# Patient Record
Sex: MALE | Race: WHITE | NOT HISPANIC OR LATINO | Employment: OTHER | RURAL
[De-identification: names, ages, dates, MRNs, and addresses within clinical notes are randomized per-mention and may not be internally consistent; named-entity substitution may affect disease eponyms.]

---

## 2017-03-06 RX ORDER — NEBIVOLOL HYDROCHLORIDE 10 MG/1
TABLET ORAL
Qty: 90 TABLET | Refills: 2 | Status: SHIPPED | OUTPATIENT
Start: 2017-03-06 | End: 2017-12-05 | Stop reason: SDUPTHER

## 2017-03-06 RX ORDER — NEBIVOLOL HYDROCHLORIDE 10 MG/1
TABLET ORAL
Qty: 90 TABLET | Refills: 2 | Status: SHIPPED | OUTPATIENT
Start: 2017-03-06 | End: 2017-03-06 | Stop reason: SDUPTHER

## 2017-03-06 RX ORDER — ROSUVASTATIN CALCIUM 40 MG/1
TABLET, FILM COATED ORAL
Qty: 90 TABLET | Refills: 2 | Status: SHIPPED | OUTPATIENT
Start: 2017-03-06 | End: 2017-03-10

## 2017-03-10 RX ORDER — ROSUVASTATIN CALCIUM 40 MG/1
40 TABLET, COATED ORAL DAILY
Qty: 90 TABLET | Refills: 1 | Status: SHIPPED | OUTPATIENT
Start: 2017-03-10 | End: 2017-09-19 | Stop reason: SDUPTHER

## 2017-06-15 ENCOUNTER — OFFICE VISIT (OUTPATIENT)
Dept: CARDIOLOGY | Facility: CLINIC | Age: 72
End: 2017-06-15

## 2017-06-15 VITALS
BODY MASS INDEX: 37.22 KG/M2 | SYSTOLIC BLOOD PRESSURE: 146 MMHG | HEART RATE: 70 BPM | HEIGHT: 74 IN | WEIGHT: 290 LBS | DIASTOLIC BLOOD PRESSURE: 88 MMHG

## 2017-06-15 DIAGNOSIS — I44.30 AVB (ATRIOVENTRICULAR BLOCK): ICD-10-CM

## 2017-06-15 DIAGNOSIS — I48.20 CHRONIC ATRIAL FIBRILLATION (HCC): Primary | ICD-10-CM

## 2017-06-15 PROCEDURE — 93280 PM DEVICE PROGR EVAL DUAL: CPT | Performed by: INTERNAL MEDICINE

## 2017-06-15 PROCEDURE — 99213 OFFICE O/P EST LOW 20 MIN: CPT | Performed by: INTERNAL MEDICINE

## 2017-06-15 RX ORDER — CETIRIZINE HYDROCHLORIDE 10 MG/1
10 TABLET ORAL DAILY
COMMUNITY

## 2017-06-15 NOTE — PROGRESS NOTES
Markus Allen  1945  [phone]      06/15/2017    [facility] CARDIOLOGY     Maxi Walker MD  [address] [place] [id number]    Chief Complaint   Patient presents with   • Atrial Fibrillation         PROBLEM LIST:  1. Atrial fibrillation:  a. Pulmonary vein ablation and ablation of right atrial tachycardia, March 2005.  b. Recurrent episodes of atrial fibrillation with AV node ablation and pacemaker implantation, July 2007.   c. Repeat pulmonary vein ablation with development of persistent atrial flutter.  d. Failure of multiple antiarrhythmic therapies.  e. Insertion of a left atrial appendage occlusive device, WATCHMAN device 24 mm, September 2015.   f. XAVI, 02/01/2016: Normal LV size and function, moderate MR and TR. Well seated WATCHMAN device with limited flow around the device with a maximum measurement of 0.28.   g. Initiation of Plavix therapy, 02/01/2016.   h. XAVI 11/2016: A 24 mm watchman device is visualized within the left atrial appendage. The device appears secure. There is a small amount of flow around one edge of the device measuring approximately 4 mm. The velocities within the device appear to be 10-20 mmHg. No thrombus or fibrinous strands appear to be in association with the device. EF 60%  2. Coronary artery disease:   3. Hypertension.   4. Dyslipidemia.  5. Hematuria.  6. Vallecillo’s esophagitis with subsequent esophageal cancer status post chemotherapy and radiation therapy.   7. Extensive GI bleed, gastric ulcers with intolerance to Coumadin.   8. Obesity.  9. Severe large incisional abdominal hernia.   10. Remote operations:  a. Upper extremity lipoma removal.  b. [place] teeth extraction.  c. Pulmonary vein ablation x2.  Allergies  Allergies   Allergen Reactions   • Codeine    • Novocain [Procaine]        Current Medications    Current Outpatient Prescriptions:   •  amLODIPine-benazepril (LOTREL) 10-20 MG per capsule, daily., Disp: , Rfl:   •   "aspirin 325 MG tablet, Take 325 mg by mouth daily., Disp: , Rfl:   •  BYSTOLIC 10 MG tablet, TAKE 1 TABLET BY MOUTH DAILY, Disp: 90 tablet, Rfl: 2  •  cetirizine (zyrTEC) 10 MG tablet, Take 10 mg by mouth Daily., Disp: , Rfl:   •  denosumab (XGEVA) 120 MG/1.7ML solution injection, Inject  under the skin Every 3 (Three) Months., Disp: , Rfl:   •  esomeprazole (NexIUM) 40 MG capsule, Take 40 mg by mouth daily., Disp: , Rfl:   •  fenofibrate 160 MG tablet, 160 mg daily., Disp: , Rfl:   •  furosemide (LASIX) 40 MG tablet, 40 mg daily., Disp: , Rfl:   •  potassium chloride (K-DUR) 10 MEQ CR tablet, 40 mEq 2 (Two) Times a Day., Disp: , Rfl:   •  rosuvastatin (CRESTOR) 40 MG tablet, Take 1 tablet by mouth Daily., Disp: 90 tablet, Rfl: 1  •  sertraline (ZOLOFT) 50 MG tablet, 50 mg 2 (two) times a day., Disp: , Rfl:     History of Present Illness   HPI    Pt presents for follow up of atrial fibrillation s/p AVN ablation with PM and s/p Watchman device. Since we last saw the pt, pt denies any awareness AF episodes, CP, LH, and dizziness. Denies any hospitalizations, ER visits, bleeding, or TIA/CVA symptoms. Overall feels well. He did have PNA and SOB and is now on the mend. He has been coughing and his PCP adjusted his allergy medication which has helped.     ROS:  General:  Denies fatigue, weight gain or loss  Cardiovascular:  Denies CP, PND, syncope, near syncope, edema or palpitations.  Pulmonary:  + URIBE, cough, or wheezing      Vitals:    06/15/17 1230   BP: 146/88   BP Location: Right arm   Patient Position: Sitting   Pulse: 70   Weight: 290 lb (132 kg)   Height: 74\" (188 cm)     PE:  General: NAD  Neck: no JVD, no carotid bruits, no TM  Heart RRR, NL S1, S2, S4 present, no rubs, murmurs  Lungs: CTA, no wheezes, rhonchi, or rales  Abd: soft, non-tender, NL BS  Ext: No musculoskeletal deformities, no edema, cyanosis, or clubbing  Psych: normal mood and affect    Diagnostic Data:  Procedures     PM check: normal function, " 100% mode switched, 95% RV paced. 9.5-10.5 years on battery    1. Chronic atrial fibrillation    2. AVB (atrioventricular block)          Plan:  1) Atrial fibrillation - chronic in nature s/p AVN ablation.   Continue present medications.   2) Anticoagulation  Continue ASA, s/p Watchman device  3) AVB with normal PM function  Wt loss, exercise, salt reduction    F/up in 6 months    Scribed for Nando Zelaya MD by Sarah Rosa PA-C. 6/15/2017  12:49 PM      I, Nando Zelaya MD, personally performed the services face to face as described in this documentation and as scribed by the above named individual in my presence, and it is both accurate and complete.  6/15/2017  1:08 PM

## 2017-06-16 ENCOUNTER — OFFICE VISIT (OUTPATIENT)
Dept: CARDIOLOGY | Facility: CLINIC | Age: 72
End: 2017-06-16

## 2017-06-16 VITALS
DIASTOLIC BLOOD PRESSURE: 82 MMHG | BODY MASS INDEX: 38.43 KG/M2 | SYSTOLIC BLOOD PRESSURE: 150 MMHG | WEIGHT: 299.4 LBS | HEART RATE: 70 BPM | HEIGHT: 74 IN

## 2017-06-16 DIAGNOSIS — E78.5 DYSLIPIDEMIA: ICD-10-CM

## 2017-06-16 DIAGNOSIS — I10 ESSENTIAL HYPERTENSION: ICD-10-CM

## 2017-06-16 DIAGNOSIS — I25.9 ISCHEMIC HEART DISEASE: ICD-10-CM

## 2017-06-16 DIAGNOSIS — I25.10 CORONARY ARTERY DISEASE INVOLVING NATIVE CORONARY ARTERY OF NATIVE HEART WITHOUT ANGINA PECTORIS: Primary | ICD-10-CM

## 2017-06-16 DIAGNOSIS — I48.20 CHRONIC ATRIAL FIBRILLATION (HCC): ICD-10-CM

## 2017-06-16 PROCEDURE — 99213 OFFICE O/P EST LOW 20 MIN: CPT | Performed by: INTERNAL MEDICINE

## 2017-06-16 RX ORDER — ASPIRIN 325 MG
325 TABLET, DELAYED RELEASE (ENTERIC COATED) ORAL DAILY
COMMUNITY

## 2017-06-16 NOTE — PROGRESS NOTES
Subjective:     Encounter Date:06/16/2017      Patient ID: Markus Allen is a 71 y.o.  white male, [facility] retired /retired [employer] , from [place], [place].     REFERRING PHYSICIAN/INTERNIST: Maxi Walker MD  [place] CARDIOLOGIST: Sami Bernal MD, [facility]  INTERVENTIONAL CARDIOLOGIST: Dhaval Joaquin MD, [facility], [facility]  ELECTROPHYSIOLOGIST: Nando Zelaya MD, [facility], [facility]  UROLOGIST: Bennett Garcia III, MD  HEMATOLOGIST: Dheeraj Knott MD   CURRENT PHYSICIAN:  Unknown    Chief Complaint:   Chief Complaint   Patient presents with   • Follow-up     Problem List:  1. Ischemic heart disease:  a. Remote intermittent recurrent CCS class III-IV angina pectoris with abnormal positive intravenous adenosine SPECT/thallium-201 study/abnormal EKG with 3-vessel coronary atherosclerosis with severe 2-vessel branch involvement/acceptable left ventricular function, LVEF (0.70), December 1997.  b. Acceptable nondiagnostic borderline GXT, LVEF (0.72), July 2003.  c. Paroxysmal atrial fibrillation with abnormal myocardial perfusion study and subsequent catheterization studies demonstrating diffuse 3-vessel coronary atherosclerosis with 90% stenosis at origin of the mid portion of second obtuse marginal branch and nondominant left circumflex coronary artery treated with angioplasty and stent deployment using drug eluting stent with acceptable left ventricular function, LVEF (0.62), January 2005.  d. Abnormal IV [place] Quantitative SPECT gated Cardiolite study, LVEF (0.65), April 2010.  e. Residual CCS class I angina pectoris/NYHA class II to III exertional dyspnea and fatigue syndrome.  f. Recent residual CCS class I symptoms.  2. Severe hypertension/probably essential with remote normal selective bilateral renal arteriography, December 1997.  3. Dyslipidemia.  4. Remote tobacco abuse, resolved.  5. Abnormal chest x-ray.  6. Remote abnormal colonoscopy with findings of  malignant polyp with subsequent sigmoid partial colectomy--data deficit, July 2014 with development of severe large incisional hernia.  7. Intermittent paroxysmal atrial fibrillation with oral anticoagulation and sotalol therapy and subsequently DC cardioversion, February 2005; March 2005, with subsequent sustained atrial fibrillation Tikosyn therapy followed by EP study and successful superior pulmonary vein isolation, as well as ablation of incessant right atrial tachycardia, May 2006 with subsequent apparent dual chamber pacemaker implant, July 2007 with subsequent EP study and AV node ablation with acceptable EP follow up, February 2009 with abnormal pacemaker interrogation with 29% AMS without reprogramming, August 2011, January 2012 with subsequent repeat XAVI and extensive attempt at ablation of atrial fibrillation with development of persistent atrial flutter, June 2012 with contemplated DC cardioversion, September 2012.   a. Watchman device implanted September 2015 by Dr. Zelaya.  b. Apparent recent pulse generator change; data deficit.  c. XAVI November 2016; a 24 mm watchman device visualized within left atrial appendage, device appears secure, small amount of fluid around one edge of the device measuring approximately 4 mm.  The velocities within the device appeared to be 10-20 mmHg.  No thrombus or fibrinous strands appear to be in association with the device.  LVEF 0.60  d. Pacemaker interrogation June 2017; normal function, 100% mode switch, 95% RV paced.  9.5-10.5 years on battery  8. Post procedure hematuria with hospitalization for hypokalemia, anemia, uncontrolled hematuria requiring cystoscopy and apparent cauterization of bladder tear -- data deficit ([facility]), May 2006 with recent progressive apparent lower tract obstructive symptoms and contemplated TURP - data deficit, June 2010.   9. Remote operations:  a. Upper extremity lipoma removal.  b. [place] teeth  extraction.  10. Moderate obesity, BMI 37.5.  11. Apparent severe anemia with transfusion and subsequent eight courses of weekly IV iron therapy - data deficit, autumn 2012.   12. Contemplated DC cardioversion, September 2012 with device interrogation demonstrating persistent atrial fibrillation with VVI rate of 40 with 100% ventricular paced, September 2013 with subsequent redo pulmonary vein isolation procedure, June 2012 with subsequent internal cardioversion of atrial fibrillation to normal sinus rhythm, September 2012 with ERAF.  13. Remote apparent acceptable colonoscopy with abnormal EGD demonstrating Vallecillo’s esophagitis with apparent esophageal carcinoma with subsequent endoscopic EGD and scheduled PET scan with contemplated laparoscopic surgical resection as well as repair of possible adjunctive repair of large incisional ventral hernia with contemplated cardiothoracic surgical consultation at [facility] - data deficit, August-September 2014 with subsequent radiotherapy and chemotherapy with subsequent exploratory laparoscopic surgery with EGD without finding residual cancer - data deficit, [facility], January 2015 with subsequent marked GI bleeding and apparent gastric ulcer - data deficit, [facility], February 2015.       Allergies   Allergen Reactions   • Codeine    • Novocain [Procaine]          Current Outpatient Prescriptions:   •  amLODIPine-benazepril (LOTREL) 10-20 MG per capsule, daily., Disp: , Rfl:   •  aspirin 325 MG tablet, Take 325 mg by mouth daily., Disp: , Rfl:   •  BYSTOLIC 10 MG tablet, TAKE 1 TABLET BY MOUTH DAILY, Disp: 90 tablet, Rfl: 2  •  cetirizine (zyrTEC) 10 MG tablet, Take 10 mg by mouth Daily., Disp: , Rfl:   •  denosumab (XGEVA) 120 MG/1.7ML solution injection, Inject  under the skin Every 3 (Three) Months., Disp: , Rfl:   •  esomeprazole (NexIUM) 40 MG capsule, Take 40 mg by mouth daily., Disp: , Rfl:   •   "fenofibrate 160 MG tablet, 160 mg daily., Disp: , Rfl:   •  furosemide (LASIX) 40 MG tablet, 40 mg daily., Disp: , Rfl:   •  potassium chloride (K-DUR) 10 MEQ CR tablet, 40 mEq 2 (Two) Times a Day., Disp: , Rfl:   •  rosuvastatin (CRESTOR) 40 MG tablet, Take 1 tablet by mouth Daily., Disp: 90 tablet, Rfl: 1  •  sertraline (ZOLOFT) 50 MG tablet, 50 mg 2 (two) times a day., Disp: , Rfl:     History of Present Illness Patient returns for scheduled 6-month followup.  He denies any chest pain, palpitations, presyncope, or syncope.  He has edema in his lower extremities but wears compression stockings and states that it is not increasing.  He was having some shortness of breath and had pneumonia in December/January, and has battled allergy symptoms since.  He has altered his allergy medication several times and is happy now with Zyrtec.  He denies any sputum production, fevers, chills, nausea, or vomiting.  He has been busy at his grandson's basketball games and is planning a hunting trip soon.  He had a PET scan on Tuesday and was negative for cancer.  He states that he has a \"hole in his spine\" and is discontinuing the use of his bone injections.  He has had difficulty recovering from pneumonia but feels like he is finally \"on the mend.\"  His previous internist has retired, and he cannot recall the name of his current physician.      Review of Systems   Constitution: Positive for malaise/fatigue and weight gain.   Cardiovascular: Positive for orthopnea.   Respiratory: Positive for cough, sleep disturbances due to breathing and wheezing.    Skin: Positive for dry skin.   Musculoskeletal: Positive for arthritis and back pain.   Allergic/Immunologic: Positive for environmental allergies.      Obtained and otherwise negative except as outlined in problem list and HPI.    Procedures       Objective:       Vitals:    06/16/17 0944 06/16/17 0946   BP: 155/85 150/82   BP Location: Right arm Right arm   Patient Position: Sitting " "Standing   Pulse: 70 70   Weight: 299 lb 6.4 oz (136 kg)    Height: 74\" (188 cm)    Recheck blood pressure left arm sitting was 140/74  Body mass index is 38.44 kg/(m^2).   Last weight:  293 lbs.    Physical Exam   Constitutional: He is oriented to person, place, and time. He appears well-developed and well-nourished.   Neck: No JVD present. Carotid bruit is not present. No thyromegaly present.   Cardiovascular: Regular rhythm, S1 normal, S2 normal and normal heart sounds.  Exam reveals no gallop, no S3 and no friction rub.    No murmur heard.  Pulses:       Carotid pulses are 1+ on the right side, and 1+ on the left side.       Radial pulses are 1+ on the right side, and 1+ on the left side.        Femoral pulses are 1+ on the right side, and 1+ on the left side.       Popliteal pulses are 1+ on the right side, and 1+ on the left side.        Dorsalis pedis pulses are 1+ on the right side, and 1+ on the left side.        Posterior tibial pulses are 1+ on the right side, and 1+ on the left side.   Pulmonary/Chest: Effort normal and breath sounds normal. He has no wheezes. He has no rhonchi. He has no rales.   Abdominal: Soft. He exhibits no mass. There is no hepatosplenomegaly. There is no tenderness. There is no guarding. A hernia (marked ventral) is present.   Musculoskeletal: He exhibits edema.   Lymphadenopathy:     He has no cervical adenopathy.   Neurological: He is alert and oriented to person, place, and time.   Skin: Skin is warm, dry and intact. No rash noted.   Vitals reviewed.      Lab Review:   Lab Results   Component Value Date    GLUCOSE 97 09/28/2015    BUN 13 09/28/2015    CREATININE 0.9 09/28/2015    CO2 29 09/28/2015    CALCIUM 8.7 09/28/2015       Lab Results   Component Value Date    WBC 8.16 09/28/2015    HGB 13.4 09/28/2015    HCT 41.5 09/28/2015    MCV 84.2 09/28/2015     09/28/2015       Lab Results   Component Value Date    HGBA1C 6.0 09/28/2015           Assessment:   Overall " continued acceptable course with no interim cardiopulmonary complaints with acceptable functional status. We will defer additional diagnostic or therapeutic intervention from a cardiac perspective at this time.  We encouraged the patient to share his labs when drawn again.  He had recent nominal evaluation during followup with Dr. Zelaya.       Diagnosis Plan   1. Coronary artery disease involving native coronary artery of native heart without angina pectoris     2. Essential hypertension     3. Chronic atrial fibrillation     4. Ischemic heart disease     5. Dyslipidemia            Plan:         1. Patient to continue current medications and close follow up with the above providers.  2. Tentative cardiology follow up in 6 months or patient may return sooner PRN; consideration of pharmacologic myocardial perfusion study shortly thereafter.      Scribed for Cornelio Jimenez MD by Luisana Moore, [facility]. 6/16/2017  10:09 AM    I, Cornelio Jimenez MD, [facility], personally performed the services described in this documentation as scribed by the above named individual in my presence, and it is both accurate and complete. At 10:30 AM on 06/16/2017

## 2017-09-19 RX ORDER — ROSUVASTATIN CALCIUM 40 MG/1
TABLET, COATED ORAL
Qty: 90 TABLET | Refills: 1 | Status: SHIPPED | OUTPATIENT
Start: 2017-09-19 | End: 2018-03-21 | Stop reason: SDUPTHER

## 2017-10-05 ENCOUNTER — CLINICAL SUPPORT NO REQUIREMENTS (OUTPATIENT)
Dept: CARDIOLOGY | Facility: CLINIC | Age: 72
End: 2017-10-05

## 2017-10-05 DIAGNOSIS — R00.1 SINUS BRADYCARDIA: ICD-10-CM

## 2017-10-05 PROCEDURE — 93296 REM INTERROG EVL PM/IDS: CPT | Performed by: INTERNAL MEDICINE

## 2017-10-05 PROCEDURE — 93294 REM INTERROG EVL PM/LDLS PM: CPT | Performed by: INTERNAL MEDICINE

## 2017-12-05 RX ORDER — NEBIVOLOL HYDROCHLORIDE 10 MG/1
TABLET ORAL
Qty: 90 TABLET | Refills: 2 | Status: SHIPPED | OUTPATIENT
Start: 2017-12-05 | End: 2018-09-04 | Stop reason: SDUPTHER

## 2018-01-17 ENCOUNTER — LAB REQUISITION (OUTPATIENT)
Dept: LAB | Facility: HOSPITAL | Age: 73
End: 2018-01-17

## 2018-01-17 ENCOUNTER — OFFICE VISIT (OUTPATIENT)
Dept: CARDIOLOGY | Facility: CLINIC | Age: 73
End: 2018-01-17

## 2018-01-17 VITALS
SYSTOLIC BLOOD PRESSURE: 149 MMHG | WEIGHT: 301.6 LBS | DIASTOLIC BLOOD PRESSURE: 69 MMHG | HEIGHT: 74 IN | BODY MASS INDEX: 38.71 KG/M2 | HEART RATE: 70 BPM

## 2018-01-17 DIAGNOSIS — I48.91 ATRIAL FIBRILLATION, UNSPECIFIED TYPE (HCC): Primary | ICD-10-CM

## 2018-01-17 DIAGNOSIS — I10 ESSENTIAL HYPERTENSION: ICD-10-CM

## 2018-01-17 DIAGNOSIS — E78.5 DYSLIPIDEMIA: ICD-10-CM

## 2018-01-17 DIAGNOSIS — I48.91 ATRIAL FIBRILLATION (HCC): ICD-10-CM

## 2018-01-17 DIAGNOSIS — I48.20 CHRONIC ATRIAL FIBRILLATION (HCC): ICD-10-CM

## 2018-01-17 DIAGNOSIS — Z00.00 ROUTINE GENERAL MEDICAL EXAMINATION AT A HEALTH CARE FACILITY: ICD-10-CM

## 2018-01-17 DIAGNOSIS — I25.10 CORONARY ARTERY DISEASE INVOLVING NATIVE CORONARY ARTERY OF NATIVE HEART WITHOUT ANGINA PECTORIS: ICD-10-CM

## 2018-01-17 PROCEDURE — 36415 COLL VENOUS BLD VENIPUNCTURE: CPT

## 2018-01-17 PROCEDURE — 99214 OFFICE O/P EST MOD 30 MIN: CPT | Performed by: INTERNAL MEDICINE

## 2018-01-17 RX ORDER — CHLORTHALIDONE 25 MG/1
12.5 TABLET ORAL DAILY
Qty: 45 TABLET | Refills: 3 | Status: SHIPPED | OUTPATIENT
Start: 2018-01-17 | End: 2018-02-27 | Stop reason: SDUPTHER

## 2018-01-17 RX ORDER — FUROSEMIDE 40 MG/1
40 TABLET ORAL DAILY
Qty: 45 TABLET | Refills: 11 | Status: SHIPPED | OUTPATIENT
Start: 2018-01-17 | End: 2019-01-11 | Stop reason: ALTCHOICE

## 2018-01-17 NOTE — PROGRESS NOTES
Subjective:     Encounter Date:01/17/2018    Patient ID: Markus Allen is a 72 y.o.  white male, [facility] retired /retired [place] , from [place], [place].      REFERRING PHYSICIAN/INTERNIST: Maxi Walker MD  [place] CARDIOLOGIST: Sami Bernal MD, Located within [facility]  INTERVENTIONAL CARDIOLOGIST: Dhaval Joaquin MD, Located within [facility], [facility]  ELECTROPHYSIOLOGIST: Nando Zelaya MD, Located within [facility], [facility]  UROLOGIST: Bennett Garcia III, MD  HEMATOLOGIST: Dheeraj Knott MD   CURRENT PHYSICIAN:  Edita Hall MD    Chief Complaint:   Chief Complaint   Patient presents with   • Shortness of Breath   • Coronary Artery Disease   • Atrial Fibrillation     Problem List:  1. Ischemic heart disease:   A. Remote intermittent recurrent CCS class III-IV angina pectoris with abnormal positive intravenous adenosine SPECT/thallium-201 study/abnormal EKG with 3-vessel coronary atherosclerosis with severe 2-vessel branch involvement/acceptable left ventricular function, LVEF (0.70), December 1997.   B. Acceptable nondiagnostic borderline GXT, LVEF (0.72), July 2003.   C. Paroxysmal atrial fibrillation with abnormal myocardial perfusion study and subsequent catheterization studies demonstrating diffuse 3-vessel coronary atherosclerosis with 90% stenosis at origin of the mid portion of second obtuse marginal branch and nondominant left circumflex coronary artery treated with angioplasty and stent deployment using drug eluting stent with acceptable left ventricular function, LVEF (0.62), January 2005.   D. Abnormal IV Adenowalk Quantitative SPECT gated Cardiolite study, LVEF (0.65), April 2010.   E. Residual CCS class I angina pectoris/NYHA class II to III exertional dyspnea and fatigue syndrome with recent [facility] ED evaluation for probable congestive heart failure, January 2018.   F. Residual class I symptoms.  2. Severe hypertension/probably essential with remote normal selective bilateral renal  arteriography, December 1997.  3. Dyslipidemia.  4. Remote tobacco abuse, resolved.  5. Abnormal chest x-ray.  6. Remote abnormal colonoscopy with findings of malignant polyp with subsequent sigmoid partial colectomy--data deficit, July 2014 with development of severe large incisional hernia.  7. Intermittent paroxysmal atrial fibrillation with oral anticoagulation and sotalol therapy and subsequently DC cardioversion, February 2005; March 2005, with subsequent sustained atrial fibrillation Tikosyn therapy followed by EP study and successful superior pulmonary vein isolation, as well as ablation of incessant right atrial tachycardia, May 2006 with subsequent apparent dual chamber pacemaker implant, July 2007 with subsequent EP study and AV node ablation with acceptable EP follow up, February 2009 with abnormal pacemaker interrogation with 29% AMS without reprogramming, August 2011, January 2012 with subsequent repeat XAVI and extensive attempt at ablation of atrial fibrillation with development of persistent atrial flutter, June 2012 with contemplated DC cardioversion, September 2012.   8. Watchman device implanted September 2015 by Dr. Zelaya.   A. Apparent recent pulse generator change; data deficit.   B. XAVI November 2016; a 24 mm watchman device visualized within left atrial appendage, device appears secure, small amount of fluid around one edge of the device measuring approximately 4 mm.  The velocities within the device appeared to be 10-20 mmHg.  No thrombus or fibrinous strands appear to be in association with the device.  LVEF 0.60   C. Pacemaker interrogation June 2017; normal function, 100% mode switch, 95% RV paced.  9.5-10.5 years on battery  9. Post procedure hematuria with hospitalization for hypokalemia, anemia, uncontrolled hematuria requiring cystoscopy and apparent cauterization of bladder tear -- data deficit ([facility]), May 2006 with recent progressive apparent lower tract  obstructive symptoms and contemplated TURP - data deficit, June 2010.   10. Remote operations:   A. Upper extremity lipoma removal.   B. Remer teeth extraction.  11. Moderate obesity, BMI 37.5.  12. Apparent severe anemia with transfusion and subsequent eight courses of weekly IV iron therapy - data deficit, autumn 2012.   13. Contemplated DC cardioversion, September 2012 with device interrogation demonstrating persistent atrial fibrillation with VVI rate of 40 with 100% ventricular paced, September 2013 with subsequent redo pulmonary vein isolation procedure, June 2012 with subsequent internal cardioversion of atrial fibrillation to normal sinus rhythm, September 2012 with ERAF.  14. Remote apparent acceptable colonoscopy with abnormal EGD demonstrating Vallecillo’s esophagitis with apparent esophageal carcinoma with subsequent endoscopic EGD and scheduled PET scan with contemplated laparoscopic surgical resection as well as repair of possible adjunctive repair of large incisional ventral hernia with contemplated cardiothoracic surgical consultation at [facility] - data deficit, August-September 2014 with subsequent radiotherapy and chemotherapy with subsequent exploratory laparoscopic surgery with EGD without finding residual cancer - data deficit, [facility], January 2015 with subsequent marked GI bleeding and apparent gastric ulcer - data deficit, [facility], February 2015.   15.  [facility] ED visit 1/6/18 for CHF symptoms with chest x-ray demonstrating cardiomegaly with pulmonary edema      Allergies   Allergen Reactions   • Codeine    • Novocain [Procaine]          Current Outpatient Prescriptions:   •  amLODIPine-benazepril (LOTREL) 10-20 MG per capsule, daily., Disp: , Rfl:   •  aspirin 81 MG EC tablet, Take 81 mg by mouth Daily., Disp: , Rfl:   •  BYSTOLIC 10 MG tablet, TAKE 1 TABLET BY MOUTH DAILY, Disp: 90 tablet,  Rfl: 2  •  Calcium Carbonate-Vitamin D3 (CALCIUM 600+D3) 600-400 MG-UNIT tablet, Take  by mouth Daily., Disp: , Rfl:   •  cetirizine (zyrTEC) 10 MG tablet, Take 10 mg by mouth Daily., Disp: , Rfl:   •  esomeprazole (NexIUM) 40 MG capsule, Take 40 mg by mouth daily., Disp: , Rfl:   •  fenofibrate 160 MG tablet, 160 mg daily., Disp: , Rfl:   •  furosemide (LASIX) 40 MG tablet, 40 mg daily., Disp: , Rfl:   •  potassium chloride (K-DUR) 10 MEQ CR tablet, 40 mEq 2 (Two) Times a Day., Disp: , Rfl:   •  rosuvastatin (CRESTOR) 40 MG tablet, TAKE 1 TABLET BY MOUTH EVERY DAY, Disp: 90 tablet, Rfl: 1  •  sertraline (ZOLOFT) 50 MG tablet, 50 mg 2 (two) times a day., Disp: , Rfl:     HISTORY OF PRESENT ILLNESS: Patient returns for scheduled followup after a 7-month hiatus.  He had a [facility] ED visit 1/6/18 when he was having increased shortness of breath.  At that time, he was treated for pneumonia but was also thought to have some CHF.  He had some mild edema in his legs but mostly was having the shortness of breath.  He denied any fevers, chills, chest pain, palpitations, presyncope, or syncope.  He has mild sputum production.  His diuretic was increased, and he states that this has helped him feel much better.  He is taking his Lasix twice a day now.  His blood pressure at home is normally between 150-160 systolic.  Patient otherwise denies chest pain, severe shortness of breath, PND, edema, palpitations, syncope or presyncope at this time on limited activity.  He is accompanied to the office today by his wife.        Review of Systems   Cardiovascular: Positive for dyspnea on exertion.   Respiratory: Positive for cough, shortness of breath, snoring and wheezing.    Skin: Positive for dry skin.      Obtained and otherwise negative except as outlined in problem list and HPI.    Procedures       Objective:       Vitals:    01/17/18 0910 01/17/18 0914   BP: 167/74 149/69   BP Location: Left arm Left  "arm   Patient Position: Sitting Standing   Pulse: 70 70   Weight: (!) 137 kg (301 lb 9.6 oz)    Height: 188 cm (74\")    Recheck blood pressure right arm sitting was 142/64  Body mass index is 38.72 kg/(m^2).   Last weight:  299 lbs.    Physical Exam   Constitutional: He is oriented to person, place, and time. He appears well-developed and well-nourished.   Neck: No JVD present. Carotid bruit is not present. No thyromegaly present.   Cardiovascular: Regular rhythm, S1 normal, S2 normal and normal heart sounds.  Exam reveals no gallop, no S3 and no friction rub.    No murmur heard.  Pulses:       Dorsalis pedis pulses are 1+ on the right side, and 1+ on the left side.        Posterior tibial pulses are 1+ on the right side, and 1+ on the left side.   Pulmonary/Chest: Effort normal. He has wheezes in the right upper field, the right middle field, the right lower field, the left upper field, the left middle field and the left lower field. He has no rhonchi. He has no rales.   Abdominal: Soft. He exhibits no mass. There is no hepatosplenomegaly. There is no tenderness. There is no guarding.   Bowel sounds audible x4   Musculoskeletal: Normal range of motion. He exhibits edema (1+ bipedal).   Lymphadenopathy:     He has no cervical adenopathy.   Neurological: He is alert and oriented to person, place, and time.   Skin: Skin is warm, dry and intact. No rash noted.   Vitals reviewed.        Lab Review:   Lab Results   Component Value Date    GLUCOSE 97 09/28/2015    BUN 13 09/28/2015    CREATININE 0.9 09/28/2015    CO2 29 09/28/2015    CALCIUM 8.7 09/28/2015       Lab Results   Component Value Date    WBC 8.16 09/28/2015    HGB 13.4 09/28/2015    HCT 41.5 09/28/2015    MCV 84.2 09/28/2015     09/28/2015       Lab Results   Component Value Date    HGBA1C 6.0 09/28/2015     · Chest x-ray 1/6/18: Slight increase in cardiomegaly with mild perihilar opacity that may represent pulmonary edema  · CMP 1/6/18: Sodium 141, " potassium 3.4, chloride 100.4, carbon dioxide 27, BUN 11, creatinine 0.9, GFR greater than 59, glucose 133, calcium 8.8, bilirubin 0.5, AST 24, AST 19, troponin less than 0.01, protein 7.5, albumin 3.9, globulin 3.6, alkaline phosphatase 70  · Pro BNP - 891.6, was 1359 in February 2017  · CBC 1/6/18: WBC 8.4, RBC 4.99, hemoglobin 14.4, hematocrit 44.9, MCV 90, MCH 28.9, MCHC 32.1, RDW 14.2, platelets 245, MPV 10.1, neutrophils 74.7, lymphocytes 11.1, monocytes 10.8, eos 2.5, basos 0.4  · ECG 1/6/18: Ventricular pacemaker, abnormal rhythm ECG, 69 bpm,  ms,  ms      Assessment:   Overall continued acceptable course with no interim cardiopulmonary complaints with acceptable functional status. We will defer additional diagnostic or therapeutic intervention from a cardiac perspective at this time.  We will add chlorthalidone 12.5 mg daily for his hypertension.  We advised the patient to continue taking his Lasix, but if he feels more short of breath or has more edema, that he may take an extra 20 mg of Lasix when necessary.  We will order an echocardiogram in June 2018 when he returns.  We will also get a BMP and magnesium today.       Diagnosis Plan   1. Atrial fibrillation, unspecified type  Adult Transthoracic Echo Complete W/ Cont if Necessary Per Protocol    Basic Metabolic Panel    Magnesium   2. Chronic atrial fibrillation  Stable   3. Coronary artery disease involving native coronary artery of native heart without angina pectoris  Stable   4. Essential hypertension  Chlorthalidone 12.5mg daily   5. Dyslipidemia  No recent labs          Plan:         1. Patient to continue current medications and close follow up with the above providers.  2. Tentative cardiology follow up in June 2018 with an echocardiogram, or patient may return sooner PRN.  3. BMP, magnesium  4. Chlorthalidone 12.5 mg daily  5. 1-800 card provided.       Scribed for Cornelio Jimenez MD by Luisana Moore, [facility]. 1/17/2018  10:38  AM    I, Cornelio Jimenez MD, FAC, personally performed the services described in this documentation as scribed by the above named individual in my presence, and it is both accurate and complete. At 10:47 AM on 01/17/2018

## 2018-01-18 ENCOUNTER — TELEPHONE (OUTPATIENT)
Dept: CARDIOLOGY | Facility: CLINIC | Age: 73
End: 2018-01-18

## 2018-01-18 LAB
BUN SERPL-MCNC: 14 MG/DL (ref 9–23)
BUN/CREAT SERPL: 15.6 (ref 7–25)
CALCIUM SERPL-MCNC: 9.2 MG/DL (ref 8.7–10.4)
CHLORIDE SERPL-SCNC: 106 MMOL/L (ref 99–109)
CO2 SERPL-SCNC: 30 MMOL/L (ref 20–31)
CREAT SERPL-MCNC: 0.9 MG/DL (ref 0.6–1.3)
GLUCOSE SERPL-MCNC: 158 MG/DL (ref 70–100)
MAGNESIUM SERPL-MCNC: 1.9 MG/DL (ref 1.3–2.7)
POTASSIUM SERPL-SCNC: 3.8 MMOL/L (ref 3.5–5.5)
SODIUM SERPL-SCNC: 143 MMOL/L (ref 132–146)

## 2018-01-18 NOTE — TELEPHONE ENCOUNTER
Spoke with patient about lab work results.  Per KTS patient is to increase K-Dur to 40 mg daily and add Magnesium 400 mg daily. Patient verbalizes understanding.

## 2018-01-22 ENCOUNTER — RESULTS ENCOUNTER (OUTPATIENT)
Dept: CARDIOLOGY | Facility: CLINIC | Age: 73
End: 2018-01-22

## 2018-01-22 DIAGNOSIS — I48.91 ATRIAL FIBRILLATION, UNSPECIFIED TYPE (HCC): ICD-10-CM

## 2018-01-30 ENCOUNTER — TELEPHONE (OUTPATIENT)
Dept: CARDIOLOGY | Facility: CLINIC | Age: 73
End: 2018-01-30

## 2018-01-30 NOTE — TELEPHONE ENCOUNTER
Patient called stating that he started his new medication Chlorthalidone 12.5 mg that was give for his blood pressure.  He states that his top number still has not decreased and is staying the in 160's with the bottom in 70's.  This medication was added at his last appointment on 1/17/2018.  Do you want to increase this  Medication or change anything?  Please advise.

## 2018-02-01 DIAGNOSIS — I10 ESSENTIAL HYPERTENSION: Primary | ICD-10-CM

## 2018-02-06 ENCOUNTER — RESULTS ENCOUNTER (OUTPATIENT)
Dept: CARDIOLOGY | Facility: CLINIC | Age: 73
End: 2018-02-06

## 2018-02-06 DIAGNOSIS — I10 ESSENTIAL HYPERTENSION: ICD-10-CM

## 2018-02-15 ENCOUNTER — OFFICE VISIT (OUTPATIENT)
Dept: CARDIOLOGY | Facility: CLINIC | Age: 73
End: 2018-02-15

## 2018-02-15 VITALS
HEART RATE: 70 BPM | SYSTOLIC BLOOD PRESSURE: 121 MMHG | BODY MASS INDEX: 37.47 KG/M2 | WEIGHT: 292 LBS | HEIGHT: 74 IN | DIASTOLIC BLOOD PRESSURE: 70 MMHG

## 2018-02-15 DIAGNOSIS — I10 ESSENTIAL HYPERTENSION: ICD-10-CM

## 2018-02-15 DIAGNOSIS — I48.19 PERSISTENT ATRIAL FIBRILLATION (HCC): Primary | ICD-10-CM

## 2018-02-15 DIAGNOSIS — I25.10 CORONARY ARTERY DISEASE INVOLVING NATIVE CORONARY ARTERY OF NATIVE HEART WITHOUT ANGINA PECTORIS: ICD-10-CM

## 2018-02-15 DIAGNOSIS — I44.30 AVB (ATRIOVENTRICULAR BLOCK): ICD-10-CM

## 2018-02-15 PROCEDURE — 99213 OFFICE O/P EST LOW 20 MIN: CPT | Performed by: INTERNAL MEDICINE

## 2018-02-15 PROCEDURE — 93279 PRGRMG DEV EVAL PM/LDLS PM: CPT | Performed by: INTERNAL MEDICINE

## 2018-02-15 NOTE — PROGRESS NOTES
Markus Allen  1945  [phone]      02/15/2018    [facility] CARDIOLOGY     Edita Hall MD  [address]  [facility] [address]    Chief Complaint   Patient presents with   • Atrial Fibrillation       Problem List:  PROBLEM LIST:  1. Atrial fibrillation:  a. Pulmonary vein ablation and ablation of right atrial tachycardia, March 2005.  b. Recurrent episodes of atrial fibrillation with AV node ablation and pacemaker implantation, July 2007.   c. Repeat pulmonary vein ablation with development of persistent atrial flutter.  d. Failure of multiple antiarrhythmic therapies.  e. Insertion of a left atrial appendage occlusive device, WATCHMAN device 24 mm, September 2015.   f. XAVI, 02/01/2016: Normal LV size and function, moderate MR and TR. Well seated WATCHMAN device with limited flow around the device with a maximum measurement of 0.28.   g. Initiation of Plavix therapy, 02/01/2016.   h. XAVI 11/2016: A 24 mm watchman device is visualized within the left atrial appendage. The device appears secure. There is a small amount of flow around one edge of the device measuring approximately 4 mm. The velocities within the device appear to be 10-20 mmHg. No thrombus or fibrinous strands appear to be in association with the device. EF 60%  2. Coronary artery disease:   3. Hypertension.   4. Dyslipidemia.  5. Hematuria.  6. Vallecillo’s esophagitis with subsequent esophageal cancer status post chemotherapy and radiation therapy.   7. Extensive GI bleed, gastric ulcers with intolerance to Coumadin.   8. Obesity.  9. Severe large incisional abdominal hernia.   10. Remote operations:  a. Upper extremity lipoma removal.  b. [place] teeth extraction.  c. Pulmonary vein ablation x2.    Allergies  Allergies   Allergen Reactions   • Codeine    • Novocain [Procaine]        Current Medications    Current Outpatient Prescriptions:   •  amLODIPine-benazepril (LOTREL) 10-20 MG per capsule, 2 (Two)  "Times a Day., Disp: , Rfl:   •  aspirin 81 MG EC tablet, Take 325 mg by mouth Daily., Disp: , Rfl:   •  BYSTOLIC 10 MG tablet, TAKE 1 TABLET BY MOUTH DAILY, Disp: 90 tablet, Rfl: 2  •  Calcium Carbonate-Vitamin D3 (CALCIUM 600+D3) 600-400 MG-UNIT tablet, Take  by mouth 2 (Two) Times a Day., Disp: , Rfl:   •  cetirizine (zyrTEC) 10 MG tablet, Take 10 mg by mouth Daily., Disp: , Rfl:   •  chlorthalidone (HYGROTON) 25 MG tablet, Take 0.5 tablets by mouth Daily. (Patient taking differently: Take 25 mg by mouth Daily.), Disp: 45 tablet, Rfl: 3  •  esomeprazole (NexIUM) 40 MG capsule, Take 40 mg by mouth daily., Disp: , Rfl:   •  fenofibrate 160 MG tablet, 160 mg daily., Disp: , Rfl:   •  furosemide (LASIX) 40 MG tablet, Take 1 tablet by mouth Daily. May take an extra 20 mg PRN daily for increased edema or SOB, Disp: 45 tablet, Rfl: 11  •  potassium chloride (K-DUR) 10 MEQ CR tablet, 40 mEq 2 (Two) Times a Day., Disp: , Rfl:   •  Prenatal MV-Min-Fe Fum-FA-DHA (PRENATAL 1 PO), Take  by mouth Daily., Disp: , Rfl:   •  rosuvastatin (CRESTOR) 40 MG tablet, TAKE 1 TABLET BY MOUTH EVERY DAY, Disp: 90 tablet, Rfl: 1  •  sertraline (ZOLOFT) 50 MG tablet, 50 mg 2 (two) times a day., Disp: , Rfl:     History of Present Illness   HPI    Pt presents for follow up of AF/AVB/ROSHAN occlusion .Since the pt has seen us in clinic last, pt denies any syncope, CP, LH, and dizziness. Denies any bleeding, or TIA/CVA symptoms. Overall feels well. Has been having short term memory loss. Had been treated for CHF/PNA in December. SOB better on lasix. BP at home was high but now better after Dr Jimenez started new med.    ROS:  General: + fatigue, No weight gain or loss  Cardiovascular:  Denies CP, PND, syncope, near syncope, edema or palpitations.  Pulmonary:  + URIBE, No cough, or wheezing    Vitals:    02/15/18 1222   BP: 121/70   BP Location: Left arm   Patient Position: Sitting   Pulse: 70   Weight: 132 kg (292 lb)   Height: 188 cm (74\") "       PE:  General: NAD  Neck: no JVD, no carotid bruits, no TM  Heart RRR, NL S1, S2, S4 present, no rubs, DOROTA murmur present  Lungs: CTA, no wheezes, rhonchi, or rales  Abd: soft, non-tender, NL BS, large ventral hernia  Ext: No musculoskeletal deformities, no edema, cyanosis, or clubbing  Psych: normal mood and affect    Diagnostic Data:  Procedures      1. Persistent atrial fibrillation    2. AVB (atrioventricular block)    3. Essential hypertension    4. Coronary artery disease involving native coronary artery of native heart without angina pectoris        PM Interrogation: NL PM fxn, Nl battery fxn, CAF, 98% paced    Plan:  1) CAF RFA of AVN: NL Pm fxn asymptomatic, NL Pm fxn  2)  Anticoagulation s/p ROSHAN occlusion on ASA  3) HTN: better controlled  Wt loss, exercise, salt reduction  4) CAD: per Dr Jimenez    F/up in 6 months

## 2018-02-27 ENCOUNTER — TELEPHONE (OUTPATIENT)
Dept: CARDIOLOGY | Facility: CLINIC | Age: 73
End: 2018-02-27

## 2018-02-27 RX ORDER — POTASSIUM CHLORIDE 1500 MG/1
40 TABLET, FILM COATED, EXTENDED RELEASE ORAL
Qty: 270 TABLET | Refills: 4 | Status: SHIPPED | OUTPATIENT
Start: 2018-02-27

## 2018-02-27 RX ORDER — CHLORTHALIDONE 25 MG/1
25 TABLET ORAL DAILY
Qty: 90 TABLET | Refills: 4 | Status: SHIPPED | OUTPATIENT
Start: 2018-02-27 | End: 2019-01-11 | Stop reason: SDUPTHER

## 2018-02-27 NOTE — TELEPHONE ENCOUNTER
Per KTS change KCL to 40 meq TID and add mag 400 mg daily.  Patient verbalizes understanding. Also patient requested a new prescription for chlorthalidone. Sent Chlorthalidone and KCL to [facility] pharmacy. Patient aware.

## 2018-03-21 RX ORDER — ROSUVASTATIN CALCIUM 40 MG/1
TABLET, COATED ORAL
Qty: 90 TABLET | Refills: 3 | Status: SHIPPED | OUTPATIENT
Start: 2018-03-21

## 2018-04-10 ENCOUNTER — TELEPHONE (OUTPATIENT)
Dept: CARDIOLOGY | Facility: CLINIC | Age: 73
End: 2018-04-10

## 2018-04-10 NOTE — TELEPHONE ENCOUNTER
Patient called and stated that he might be undergoing a procedure for a bladder biopsy and wanted to know if it was okay to stop aspirin 5 days prior?

## 2018-05-22 ENCOUNTER — CLINICAL SUPPORT NO REQUIREMENTS (OUTPATIENT)
Dept: CARDIOLOGY | Facility: CLINIC | Age: 73
End: 2018-05-22

## 2018-05-23 ENCOUNTER — TELEPHONE (OUTPATIENT)
Dept: CARDIOLOGY | Facility: CLINIC | Age: 73
End: 2018-05-23

## 2018-05-24 ENCOUNTER — TELEPHONE (OUTPATIENT)
Dept: CARDIOLOGY | Facility: CLINIC | Age: 73
End: 2018-05-24

## 2018-05-24 ENCOUNTER — CLINICAL SUPPORT NO REQUIREMENTS (OUTPATIENT)
Dept: CARDIOLOGY | Facility: CLINIC | Age: 73
End: 2018-05-24

## 2018-05-24 DIAGNOSIS — I48.19 PERSISTENT ATRIAL FIBRILLATION (HCC): ICD-10-CM

## 2018-05-24 PROCEDURE — 93296 REM INTERROG EVL PM/IDS: CPT | Performed by: INTERNAL MEDICINE

## 2018-05-24 PROCEDURE — 93294 REM INTERROG EVL PM/LDLS PM: CPT | Performed by: INTERNAL MEDICINE

## 2018-05-24 NOTE — TELEPHONE ENCOUNTER
Left message yesterday for Mr Dahl to return my call regarding home monitoring. His wife called me this morning to inquire why I was calling. I explained to her that I needed him to send in a manual transmission and she stated she was at work and I could call him at home. I called him at home and again had to leave a message.

## 2018-06-20 ENCOUNTER — HOSPITAL ENCOUNTER (OUTPATIENT)
Dept: CARDIOLOGY | Facility: HOSPITAL | Age: 73
Discharge: HOME | End: 2018-06-20
Attending: INTERNAL MEDICINE | Admitting: INTERNAL MEDICINE

## 2018-06-20 ENCOUNTER — OFFICE VISIT (OUTPATIENT)
Dept: CARDIOLOGY | Facility: CLINIC | Age: 73
End: 2018-06-20

## 2018-06-20 VITALS
DIASTOLIC BLOOD PRESSURE: 66 MMHG | WEIGHT: 308 LBS | HEART RATE: 70 BPM | SYSTOLIC BLOOD PRESSURE: 126 MMHG | BODY MASS INDEX: 39.53 KG/M2 | HEIGHT: 74 IN

## 2018-06-20 DIAGNOSIS — E66.9 OBESITY (BMI 35.0-39.9 WITHOUT COMORBIDITY): ICD-10-CM

## 2018-06-20 DIAGNOSIS — I25.9 ISCHEMIC HEART DISEASE: Primary | ICD-10-CM

## 2018-06-20 DIAGNOSIS — E78.5 DYSLIPIDEMIA: ICD-10-CM

## 2018-06-20 DIAGNOSIS — I48.91 ATRIAL FIBRILLATION, UNSPECIFIED TYPE (HCC): ICD-10-CM

## 2018-06-20 DIAGNOSIS — I48.20 CHRONIC ATRIAL FIBRILLATION (HCC): ICD-10-CM

## 2018-06-20 LAB
BH CV ECHO MEAS - AO ROOT AREA (BSA CORRECTED): 1
BH CV ECHO MEAS - AO ROOT AREA: 5.5 CM^2
BH CV ECHO MEAS - AO ROOT DIAM: 2.7 CM
BH CV ECHO MEAS - BSA(HAYCOCK): 2.7 M^2
BH CV ECHO MEAS - BSA: 2.6 M^2
BH CV ECHO MEAS - BZI_BMI: 37.5 KILOGRAMS/M^2
BH CV ECHO MEAS - BZI_METRIC_HEIGHT: 188 CM
BH CV ECHO MEAS - BZI_METRIC_WEIGHT: 132.5 KG
BH CV ECHO MEAS - EDV(CUBED): 146.7 ML
BH CV ECHO MEAS - EDV(TEICH): 133.8 ML
BH CV ECHO MEAS - EF(CUBED): 64.4 %
BH CV ECHO MEAS - EF(TEICH): 55.5 %
BH CV ECHO MEAS - ESV(CUBED): 52.3 ML
BH CV ECHO MEAS - ESV(TEICH): 59.6 ML
BH CV ECHO MEAS - FS: 29.1 %
BH CV ECHO MEAS - IVS/LVPW: 0.99
BH CV ECHO MEAS - IVSD: 1.3 CM
BH CV ECHO MEAS - LA DIMENSION: 6 CM
BH CV ECHO MEAS - LA/AO: 2.3
BH CV ECHO MEAS - LV MASS(C)D: 294.6 GRAMS
BH CV ECHO MEAS - LV MASS(C)DI: 115.4 GRAMS/M^2
BH CV ECHO MEAS - LVIDD: 5.3 CM
BH CV ECHO MEAS - LVIDS: 3.7 CM
BH CV ECHO MEAS - LVPWD: 1.3 CM
BH CV ECHO MEAS - MV A MAX VEL: 34.3 CM/SEC
BH CV ECHO MEAS - MV DEC SLOPE: 532 CM/SEC^2
BH CV ECHO MEAS - MV DEC TIME: 0.29 SEC
BH CV ECHO MEAS - MV E MAX VEL: 106 CM/SEC
BH CV ECHO MEAS - MV E/A: 3.1
BH CV ECHO MEAS - MV P1/2T MAX VEL: 148.8 CM/SEC
BH CV ECHO MEAS - MV P1/2T: 81.9 MSEC
BH CV ECHO MEAS - MVA P1/2T LCG: 1.5 CM^2
BH CV ECHO MEAS - MVA(P1/2T): 2.7 CM^2
BH CV ECHO MEAS - PA ACC TIME: 0.14 SEC
BH CV ECHO MEAS - PA PR(ACCEL): 14.1 MMHG
BH CV ECHO MEAS - PI END-D VEL: 186.8 CM/SEC
BH CV ECHO MEAS - RAP SYSTOLE: 3 MMHG
BH CV ECHO MEAS - RV MAX PG: 2.6 MMHG
BH CV ECHO MEAS - RV V1 MAX: 81.4 CM/SEC
BH CV ECHO MEAS - RVSP: 37 MMHG
BH CV ECHO MEAS - SI(CUBED): 37 ML/M^2
BH CV ECHO MEAS - SI(TEICH): 29.1 ML/M^2
BH CV ECHO MEAS - SV(CUBED): 94.4 ML
BH CV ECHO MEAS - SV(TEICH): 74.2 ML
BH CV ECHO MEAS - TAPSE (>1.6): 1.83 CM2
BH CV ECHO MEAS - TR MAX VEL: 290.9 CM/SEC
BH CV XLRA - RV BASE: 3.82 CM
BH CV XLRA - RV LENGTH: 7.46 CM
BH CV XLRA - RV MID: 3.8 CM
BH CV XLRA - TDI S': 11.2 CM/SEC
LV EF 2D ECHO EST: 58 %
MAXIMAL PREDICTED HEART RATE: 150 BPM
STRESS TARGET HR: 128 BPM

## 2018-06-20 PROCEDURE — 93306 TTE W/DOPPLER COMPLETE: CPT | Performed by: INTERNAL MEDICINE

## 2018-06-20 PROCEDURE — 93306 TTE W/DOPPLER COMPLETE: CPT

## 2018-06-20 PROCEDURE — 99214 OFFICE O/P EST MOD 30 MIN: CPT | Performed by: INTERNAL MEDICINE

## 2018-06-20 RX ORDER — TAMSULOSIN HYDROCHLORIDE 0.4 MG/1
1 CAPSULE ORAL NIGHTLY
COMMUNITY

## 2018-06-20 NOTE — PROGRESS NOTES
Subjective:     Encounter Date:06/20/2018    Patient ID: Markus Allen is a 72 y.o.  white male, [facility] [employer] retired /retired [employer] , from [place], [place].      REFERRING PHYSICIAN/INTERNIST: Maxi Walker MD  [place] CARDIOLOGIST: Sami Bernal MD, [facility]  INTERVENTIONAL CARDIOLOGIST: Dhaval Joaquin MD, [facility], [facility] [place] [facility]  ELECTROPHYSIOLOGIST: Nando Zelaya MD, [facility], [facility]  UROLOGIST: Bennett Garcia III, MD  HEMATOLOGIST: Dheerja Knott MD   CURRENT PHYSICIAN:  Edita Hall MD    Chief Complaint:   Chief Complaint   Patient presents with   • Atrial Fibrillation   • Coronary Artery Disease   • Shortness of Breath     on exertion   • Dizziness   • Edema     Problem List:  1. Ischemic heart disease:              A. Remote intermittent recurrent CCS class III-IV angina pectoris with abnormal positive intravenous adenosine SPECT/thallium-201 study/abnormal EKG with 3-vessel coronary atherosclerosis with severe 2-vessel branch involvement/acceptable left ventricular function, LVEF (0.70), December 1997.              B. Acceptable nondiagnostic borderline GXT, LVEF (0.72), July 2003.              C. Paroxysmal atrial fibrillation with abnormal myocardial perfusion study and subsequent catheterization studies demonstrating diffuse 3-vessel coronary atherosclerosis with 90% stenosis at origin of the mid portion of second obtuse marginal branch and nondominant left circumflex coronary artery treated with angioplasty and stent deployment using drug eluting stent with acceptable left ventricular function, LVEF (0.62), January 2005.              D. Abnormal IV [place] Quantitative SPECT gated Cardiolite study, LVEF (0.65), April 2010.              E. Residual CCS class I angina pectoris/NYHA class II to III exertional dyspnea and fatigue syndrome with recent [facility] ED evaluation for probable congestive heart failure, January 2018.              F. Residual class I  symptoms.  2. Severe hypertension/probably essential with remote normal selective bilateral renal arteriography, December 1997.  3. Dyslipidemia.  4. Remote tobacco abuse, resolved.  5. Abnormal chest x-ray.  6. Remote abnormal colonoscopy with findings of malignant polyp with subsequent sigmoid partial colectomy--data deficit, July 2014 with development of severe large incisional hernia.  7. Intermittent paroxysmal atrial fibrillation with oral anticoagulation and sotalol therapy and subsequently DC cardioversion, February 2005; March 2005, with subsequent sustained atrial fibrillation Tikosyn therapy followed by EP study and successful superior pulmonary vein isolation, as well as ablation of incessant right atrial tachycardia, May 2006 with subsequent apparent dual chamber pacemaker implant, July 2007 with subsequent EP study and AV node ablation with acceptable EP follow up, February 2009 with abnormal pacemaker interrogation with 29% AMS without reprogramming, August 2011, January 2012 with subsequent repeat XAVI and extensive attempt at ablation of atrial fibrillation with development of persistent atrial flutter, June 2012 with contemplated DC cardioversion, September 2012.   8. Watchman device implanted September 2015 by Dr. Zelaya.              A. Apparent recent pulse generator change; data deficit.              B. XAVI, November 2016; a 24 mm Watchman device visualized within left atrial appendage, device appears secure, small amount of fluid around one edge of the device measuring approximately 4 mm.  The velocities within the device appeared to be 10-20 mmHg.  No thrombus or fibrinous strands appear to be in association with the device.  LVEF 0.60              C. Pacemaker interrogation, June 2017; normal function, 100% mode switch, 95% RV paced; 9.5-10.5 years on battery  9. Post procedure hematuria with hospitalization for hypokalemia, anemia, uncontrolled hematuria requiring cystoscopy and apparent  cauterization of bladder tear -- data deficit ([facility]), May 2006 with recent progressive apparent lower tract obstructive symptoms and contemplated TURP - data deficit, June 2010.   10. Remote operations:              A. Upper extremity lipoma removal.              B. [place] teeth extraction.  11. Moderate obesity, BMI 39.5.  12. Apparent severe anemia with transfusion and subsequent eight courses of weekly IV iron therapy - data deficit, autumn 2012.   13. Contemplated DC cardioversion, September 2012, with device interrogation demonstrating persistent atrial fibrillation with VVI rate of 40 with 100% ventricular paced, September 2013 with subsequent redo pulmonary vein isolation procedure, June 2012 with subsequent internal cardioversion of atrial fibrillation to normal sinus rhythm, September 2012 with ERAF.  14. Remote apparent acceptable colonoscopy with abnormal EGD demonstrating Vallecillo’s esophagitis with apparent esophageal carcinoma with subsequent endoscopic EGD and scheduled PET scan with contemplated laparoscopic surgical resection as well as repair of possible adjunctive repair of large incisional ventral hernia with contemplated cardiothoracic surgical consultation at [facility] - data deficit, August-September 2014 with subsequent radiotherapy and chemotherapy with subsequent exploratory laparoscopic surgery with EGD without finding of residual cancer - data deficit, [facility], January 2015 with subsequent marked GI bleeding and apparent gastric ulcer - data deficit, [facility], February 2015.   15.  [facility] ED visit 1/6/18 for CHF symptoms with chest x-ray demonstrating cardiomegaly with pulmonary edema      Allergies   Allergen Reactions   • Codeine    • Novocain [Procaine]          Current Outpatient Prescriptions:   •  amLODIPine-benazepril (LOTREL) 10-20 MG per capsule, 2 (Two)  Times a Day., Disp: , Rfl:   •  aspirin 81 MG EC tablet, Take 325 mg by mouth Daily., Disp: , Rfl:   •  BYSTOLIC 10 MG tablet, TAKE 1 TABLET BY MOUTH DAILY, Disp: 90 tablet, Rfl: 2  •  Calcium Carbonate-Vitamin D3 (CALCIUM 600+D3) 600-400 MG-UNIT tablet, Take  by mouth 2 (Two) Times a Day., Disp: , Rfl:   •  cetirizine (zyrTEC) 10 MG tablet, Take 10 mg by mouth Daily., Disp: , Rfl:   •  chlorthalidone (HYGROTON) 25 MG tablet, Take 1 tablet by mouth Daily., Disp: 90 tablet, Rfl: 4  •  esomeprazole (NexIUM) 40 MG capsule, Take 40 mg by mouth daily., Disp: , Rfl:   •  fenofibrate 160 MG tablet, 160 mg daily., Disp: , Rfl:   •  furosemide (LASIX) 40 MG tablet, Take 1 tablet by mouth Daily. May take an extra 20 mg PRN daily for increased edema or SOB, Disp: 45 tablet, Rfl: 11  •  magnesium oxide (MAGOX) 400 (241.3 Mg) MG tablet tablet, Take 400 mg by mouth Daily., Disp: , Rfl:   •  potassium chloride (K-TAB) 20 MEQ tablet controlled-release ER tablet, Take 2 tablets by mouth 3 (Three) Times a Day With Meals., Disp: 270 tablet, Rfl: 4  •  Prenatal MV-Min-Fe Fum-FA-DHA (PRENATAL 1 PO), Take  by mouth Daily., Disp: , Rfl:   •  rosuvastatin (CRESTOR) 40 MG tablet, TAKE 1 TABLET BY MOUTH EVERY DAY, Disp: 90 tablet, Rfl: 3  •  sertraline (ZOLOFT) 50 MG tablet, 50 mg 2 (two) times a day., Disp: , Rfl:   •  tamsulosin (FLOMAX) 0.4 MG capsule 24 hr capsule, Take 1 capsule by mouth Every Night., Disp: , Rfl:     HISTORY OF PRESENT ILLNESS: Patient presents for scheduled 5-month followup.  He had an echocardiogram before presenting to our office today, and this will be read, with a letter forwarded to the patient with those results.  He is accompanied to the office today by his wife.  He says that about 2 weeks ago, he was feeling really good, but then he started having hematuria, and he had a cystoscopy and a CT scan and was found to have a small renal tumor; data deficit.  He is scheduled to have another CT scan in autumn 2018.   "He plans to get back to walking now that he is starting to feel better.  He is scheduled to see Dr. Hall tomorrow and will have labs drawn at that time; he will ask that we be provided those results.  He states that when he was having a bladder scan, he got up quickly and had dizziness; he is cautioned to be careful anytime he is changing position.  He has no symptoms from a cardiopulmonary perspective with his daily activities.  Patient otherwise denies chest pain, shortness of breath, PND, edema, palpitations, syncope or presyncope at this time on limited activity.        Review of Systems   Cardiovascular: Positive for leg swelling.   Respiratory: Positive for shortness of breath and sleep disturbances due to breathing.    Musculoskeletal: Positive for back pain, joint pain and joint swelling.      Obtained and otherwise negative except as outlined in problem list and HPI.    Procedures       Objective:       Vitals:    06/20/18 1501 06/20/18 1502   BP: 134/80 126/66   BP Location: Left arm Left arm   Patient Position: Sitting Standing   Pulse: 70 70   Weight:  (!) 140 kg (308 lb)   Height:  188 cm (74\")     Body mass index is 39.54 kg/m².   Last weight:  301 lbs.    Physical Exam   Constitutional: He is oriented to person, place, and time. He appears well-developed and well-nourished.   Neck: No JVD present. Carotid bruit is not present. No thyromegaly present.   Cardiovascular: Regular rhythm, S1 normal and S2 normal.  Exam reveals distant heart sounds. Exam reveals no gallop, no S3 and no friction rub.    No murmur heard.  Pulses:       Carotid pulses are 1+ on the right side, and 1+ on the left side.       Radial pulses are 1+ on the right side, and 1+ on the left side.        Femoral pulses are 1+ on the right side, and 1+ on the left side.       Popliteal pulses are 1+ on the right side, and 1+ on the left side.        Dorsalis pedis pulses are 1+ on the right side, and 1+ on the left side.        " Posterior tibial pulses are 1+ on the right side, and 1+ on the left side.   Pulmonary/Chest: Effort normal. He has decreased breath sounds. He has no wheezes. He has no rhonchi. He has no rales.   Left precordial pacemaker site is nominal   Abdominal: Soft. He exhibits no mass. There is no hepatosplenomegaly. There is no tenderness. There is no guarding.   Bowel sounds audible x4   Musculoskeletal: Normal range of motion. He exhibits no edema.   Lymphadenopathy:     He has no cervical adenopathy.   Neurological: He is alert and oriented to person, place, and time.   Skin: Skin is warm, dry and intact. No rash noted.   Vitals reviewed.        Lab Review:   Lab Results   Component Value Date    GLUCOSE 97 09/28/2015    BUN 14 01/17/2018    CREATININE 0.90 01/17/2018    EGFRIFNONA 83 01/17/2018    EGFRIFAFRI 101 01/17/2018    BCR 15.6 01/17/2018    CO2 30.0 01/17/2018    CALCIUM 9.2 01/17/2018       Lab Results   Component Value Date    WBC 8.16 09/28/2015    HGB 13.4 09/28/2015    HCT 41.5 09/28/2015    MCV 84.2 09/28/2015     09/28/2015       Lab Results   Component Value Date    HGBA1C 6.0 09/28/2015           Assessment:   Overall continued acceptable course with no interim cardiopulmonary complaints with acceptable functional status. We will defer additional diagnostic or therapeutic intervention from a cardiac perspective at this time.  Hopefully, we will be allowed to obtain the results of any lab tests he has done tomorrow in Dr. Hall's office.  Would defer [facility]/C at this time.       Diagnosis Plan   1. Ischemic heart disease  No recurrent angina pectoris or CHF; continue current treatment on limited activity   2. Obesity (BMI 35.0-39.9 without comorbidity)  Continued efforts at caloric restriction and activity encouraged   3. Chronic atrial fibrillation  Continue current treatment and follow up with Dr. Zelaya as scheduled in 2 months   4. Dyslipidemia  No data to review          Plan:          1. Patient to continue current medications and close follow up with the above providers.  2. Tentative cardiology follow up in December 2018, or patient may return sooner PRN.    Transcribed by Lashae Salas for Dr. Cornelio Jimenez at 3:13 PM on 06/20/2018    ICornelio MD, [facility], personally performed the services described in this documentation as scribed by the above named individual in my presence, and it is both accurate and complete. At 4:34 PM on 06/20/2018

## 2018-08-16 ENCOUNTER — OFFICE VISIT (OUTPATIENT)
Dept: CARDIOLOGY | Facility: CLINIC | Age: 73
End: 2018-08-16

## 2018-08-16 VITALS
HEART RATE: 71 BPM | DIASTOLIC BLOOD PRESSURE: 82 MMHG | HEIGHT: 74 IN | WEIGHT: 290 LBS | SYSTOLIC BLOOD PRESSURE: 125 MMHG | BODY MASS INDEX: 37.22 KG/M2 | OXYGEN SATURATION: 97 %

## 2018-08-16 DIAGNOSIS — I48.20 CHRONIC ATRIAL FIBRILLATION (HCC): Primary | ICD-10-CM

## 2018-08-16 DIAGNOSIS — I44.30 AVB (ATRIOVENTRICULAR BLOCK): ICD-10-CM

## 2018-08-16 DIAGNOSIS — I25.10 CORONARY ARTERY DISEASE INVOLVING NATIVE CORONARY ARTERY OF NATIVE HEART WITHOUT ANGINA PECTORIS: ICD-10-CM

## 2018-08-16 DIAGNOSIS — I10 ESSENTIAL HYPERTENSION: ICD-10-CM

## 2018-08-16 PROCEDURE — 93280 PM DEVICE PROGR EVAL DUAL: CPT | Performed by: PHYSICIAN ASSISTANT

## 2018-08-16 PROCEDURE — 99213 OFFICE O/P EST LOW 20 MIN: CPT | Performed by: PHYSICIAN ASSISTANT

## 2018-08-16 NOTE — PROGRESS NOTES
Markus Allen  1945    There is no work phone number on file.    08/16/2018    [facility] CARDIOLOGY     Edita Hall MD  [address]  [facility] [id number]    Chief Complaint   Patient presents with   • Atrial Fibrillation         PROBLEM LIST:  1. Atrial fibrillation:  a. Pulmonary vein ablation and ablation of right atrial tachycardia, March 2005.  b. Recurrent episodes of atrial fibrillation with AV node ablation and pacemaker implantation, July 2007.   c. Repeat pulmonary vein ablation with development of persistent atrial flutter.  d. Failure of multiple antiarrhythmic therapies.  e. Insertion of a left atrial appendage occlusive device, WATCHMAN device 24 mm, September 2015.   f. XAVI, 02/01/2016: Normal LV size and function, moderate MR and TR. Well seated WATCHMAN device with limited flow around the device with a maximum measurement of 0.28.   g. Initiation of Plavix therapy, 02/01/2016.   h. XAVI 11/2016: A 24 mm watchman device is visualized within the left atrial appendage. The device appears secure. There is a small amount of flow around one edge of the device measuring approximately 4 mm. The velocities within the device appear to be 10-20 mmHg. No thrombus or fibrinous strands appear to be in association with the device. EF 60%  2. Coronary artery disease:   3. Hypertension.   4. Dyslipidemia.  5. Hematuria.  6. Vallecillo’s esophagitis with subsequent esophageal cancer status post chemotherapy and radiation therapy.   7. Extensive GI bleed, gastric ulcers with intolerance to Coumadin.   8. Obesity.  9. Severe large incisional abdominal hernia.   10. Remote operations:  a. Upper extremity lipoma removal.  b. [place] teeth extraction.  c. Pulmonary vein ablation x2.    Allergies  Allergies   Allergen Reactions   • Codeine    • Novocain [Procaine]        Current Medications    Current Outpatient Prescriptions:   •  amLODIPine-benazepril (LOTREL) 10-20 MG per  capsule, 2 (Two) Times a Day., Disp: , Rfl:   •  aspirin 81 MG EC tablet, Take 325 mg by mouth Daily., Disp: , Rfl:   •  BYSTOLIC 10 MG tablet, TAKE 1 TABLET BY MOUTH DAILY, Disp: 90 tablet, Rfl: 2  •  Calcium Carbonate-Vitamin D3 (CALCIUM 600+D3) 600-400 MG-UNIT tablet, Take  by mouth 2 (Two) Times a Day., Disp: , Rfl:   •  cetirizine (zyrTEC) 10 MG tablet, Take 10 mg by mouth Daily., Disp: , Rfl:   •  chlorthalidone (HYGROTON) 25 MG tablet, Take 1 tablet by mouth Daily., Disp: 90 tablet, Rfl: 4  •  esomeprazole (NexIUM) 40 MG capsule, Take 40 mg by mouth daily., Disp: , Rfl:   •  fenofibrate 160 MG tablet, 160 mg daily., Disp: , Rfl:   •  furosemide (LASIX) 40 MG tablet, Take 1 tablet by mouth Daily. May take an extra 20 mg PRN daily for increased edema or SOB, Disp: 45 tablet, Rfl: 11  •  magnesium oxide (MAGOX) 400 (241.3 Mg) MG tablet tablet, Take 400 mg by mouth 2 (Two) Times a Day., Disp: , Rfl:   •  potassium chloride (K-TAB) 20 MEQ tablet controlled-release ER tablet, Take 2 tablets by mouth 3 (Three) Times a Day With Meals., Disp: 270 tablet, Rfl: 4  •  Prenatal MV-Min-Fe Fum-FA-DHA (PRENATAL 1 PO), Take  by mouth Daily., Disp: , Rfl:   •  rosuvastatin (CRESTOR) 40 MG tablet, TAKE 1 TABLET BY MOUTH EVERY DAY, Disp: 90 tablet, Rfl: 3  •  sertraline (ZOLOFT) 50 MG tablet, 50 mg 2 (two) times a day., Disp: , Rfl:   •  tamsulosin (FLOMAX) 0.4 MG capsule 24 hr capsule, Take 1 capsule by mouth Every Night., Disp: , Rfl:     History of Present Illness   HPI    Pt presents for follow up of Atrial fibrillation, AVB, and ROSHAN occlusion. Since we last saw the pt, pt denies any awareness of  AF, SOB, CP, LH, and dizziness/ syncope. Denies any hospitalizations, ER visits, bleeding, or TIA/CVA symptoms. He saw Dr. Jimenez in June and no changes were made to his medications. BP is well controlled.     ROS:  General:  + fatigue,-  weight gain or loss  Cardiovascular:  Denies CP, PND, syncope, near syncope, +edema-  "palpitations.  Pulmonary:  + URIBE, cough, or wheezing      Vitals:    08/16/18 1214   BP: 125/82   BP Location: Right arm   Patient Position: Sitting   Pulse: 71   SpO2: 97%   Weight: 132 kg (290 lb)   Height: 188 cm (74\")     Body mass index is 37.23 kg/m².  PE:  General: NAD. A & O x 3  Neck: no JVD, no carotid bruits, no TM  Heart RRR, NL S1, S2, S4 present, no rubs, murmurs  Lungs: CTA, no wheezes, rhonchi, or rales  Abd: soft, non-tender, NL BS  Ext: No musculoskeletal deformities, 1+ edema, - cyanosis, or clubbing  Psych: normal mood and affect    Diagnostic Data:    PM check: normal function, 100% afib, 100% V paced. 12 years on battery. Changed to VVIR today.     Procedures    1. Chronic atrial fibrillation (CMS/HCC)    2. AVB (atrioventricular block)    3. Essential hypertension    4. Coronary artery disease involving native coronary artery of native heart without angina pectoris          Plan:  1) CAF RFA of AVN: NL Pm fxn asymptomatic, NL Pm fxn. Changed PM setting to VVIR.   2)  Anticoagulation s/p ROSHAN occlusion on ASA  3) HTN:  controlled  Wt loss, exercise, salt reduction  4) CAD: per Dr Jimenez, recntly seen in June by Dr. Jimenez with no changes made    F/up in 6 months    [facility]  8/16/2018   12:26 PM    "

## 2018-09-04 RX ORDER — NEBIVOLOL HYDROCHLORIDE 10 MG/1
TABLET ORAL
Qty: 90 TABLET | Refills: 2 | Status: SHIPPED | OUTPATIENT
Start: 2018-09-04 | End: 2019-06-03 | Stop reason: SDUPTHER

## 2018-11-14 ENCOUNTER — CLINICAL SUPPORT NO REQUIREMENTS (OUTPATIENT)
Dept: CARDIOLOGY | Facility: CLINIC | Age: 73
End: 2018-11-14

## 2018-11-14 DIAGNOSIS — I48.91 ATRIAL FIBRILLATION, UNSPECIFIED TYPE (HCC): ICD-10-CM

## 2018-11-14 PROCEDURE — 93296 REM INTERROG EVL PM/IDS: CPT | Performed by: INTERNAL MEDICINE

## 2018-11-14 PROCEDURE — 93294 REM INTERROG EVL PM/LDLS PM: CPT | Performed by: INTERNAL MEDICINE

## 2019-01-11 ENCOUNTER — OFFICE VISIT (OUTPATIENT)
Dept: CARDIOLOGY | Facility: CLINIC | Age: 74
End: 2019-01-11

## 2019-01-11 VITALS
SYSTOLIC BLOOD PRESSURE: 145 MMHG | HEART RATE: 72 BPM | BODY MASS INDEX: 40.43 KG/M2 | WEIGHT: 315 LBS | HEIGHT: 74 IN | DIASTOLIC BLOOD PRESSURE: 75 MMHG

## 2019-01-11 DIAGNOSIS — I48.20 CHRONIC ATRIAL FIBRILLATION (HCC): ICD-10-CM

## 2019-01-11 DIAGNOSIS — E78.5 DYSLIPIDEMIA: ICD-10-CM

## 2019-01-11 DIAGNOSIS — I10 ESSENTIAL HYPERTENSION: ICD-10-CM

## 2019-01-11 DIAGNOSIS — I25.10 CORONARY ARTERY DISEASE INVOLVING NATIVE CORONARY ARTERY OF NATIVE HEART WITHOUT ANGINA PECTORIS: Primary | ICD-10-CM

## 2019-01-11 PROCEDURE — 99214 OFFICE O/P EST MOD 30 MIN: CPT | Performed by: INTERNAL MEDICINE

## 2019-01-11 RX ORDER — CHLORTHALIDONE 25 MG/1
12.5 TABLET ORAL DAILY
Qty: 45 TABLET | Refills: 3 | Status: SHIPPED | OUTPATIENT
Start: 2019-01-11 | End: 2020-02-05 | Stop reason: DRUGHIGH

## 2019-01-11 RX ORDER — EPLERENONE 25 MG/1
25 TABLET, FILM COATED ORAL DAILY
Qty: 90 TABLET | Refills: 2 | Status: SHIPPED | OUTPATIENT
Start: 2019-01-11 | End: 2019-10-14 | Stop reason: SDUPTHER

## 2019-01-11 RX ORDER — TORSEMIDE 20 MG/1
20 TABLET ORAL DAILY
Qty: 90 TABLET | Refills: 3 | Status: SHIPPED | OUTPATIENT
Start: 2019-01-11 | End: 2020-01-08

## 2019-01-11 RX ORDER — NITROGLYCERIN 0.4 MG/1
TABLET SUBLINGUAL
Qty: 100 TABLET | Refills: 1 | Status: SHIPPED | OUTPATIENT
Start: 2019-01-11

## 2019-01-11 NOTE — PROGRESS NOTES
Subjective:     Encounter Date:01/11/2019    Patient ID: Markus Allen is a 73 y.o.   white male, [facility] retired /retired [place] , from [place], [place].      REFERRING PHYSICIAN/INTERNIST: Maxi Walker MD  [place] CARDIOLOGIST: Sami Bernal MD, [facility]  INTERVENTIONAL CARDIOLOGIST: Dhavla Joaquin MD, [facility], [facility]  ELECTROPHYSIOLOGIST: Nando Zelaya MD, [facility], [facility]  UROLOGIST: Bennett Garcia III, MD  HEMATOLOGIST: Dheeraj Knott MD   CURRENT PHYSICIAN:  Edtia Hall MD    Chief Complaint:   Chief Complaint   Patient presents with   • Atrial Fibrillation     Problem List:  1. Ischemic heart disease:  a. Remote intermittent recurrent CCS class III-IV angina pectoris with abnormal positive intravenous adenosine SPECT/thallium-201 study/abnormal EKG with 3-vessel coronary atherosclerosis with severe 2-vessel branch involvement/acceptable left ventricular function, LVEF (0.70), December 1997.  b. Acceptable nondiagnostic borderline GXT, LVEF (0.72), July 2003.  c. Paroxysmal atrial fibrillation with abnormal myocardial perfusion study and subsequent catheterization studies demonstrating diffuse 3-vessel coronary atherosclerosis with 90% stenosis at origin of the mid portion of second obtuse marginal branch and nondominant left circumflex coronary artery treated with angioplasty and stent deployment using drug eluting stent with acceptable left ventricular function, LVEF (0.62), January 2005.  d. Abnormal IV Adenowalk Quantitative SPECT gated Cardiolite study, LVEF (0.65), April 2010.  e. Residual CCS class I angina pectoris/NYHA class II to III exertional dyspnea and fatigue syndrome with recent [facility] ED evaluation for probable congestive heart failure, January 2018.  f. Echocardiogram 6/20/18: LA severely dilated, mild to moderate MR, mild-to-moderate TR, the following LV wall segments are hypokinetic: Apical septal, mid inferoseptal, apex hypokinetic  and mid anteroseptal.  LVEF 0.58, RV mildly dilated, no pericardial effusion, mild pulmonary hypertension, mild MAC present   g. CCS  class I chest discomfort/NYHA class I-II URIBE symptoms.  2. Severe hypertension/probably essential with remote normal selective bilateral renal arteriography, December 1997.  3. Dyslipidemia.  4. Remote tobacco abuse, resolved.  5. Abnormal chest x-ray.  6. Remote abnormal colonoscopy with findings of malignant polyp with subsequent sigmoid partial colectomy--data deficit, July 2014 with development of severe large incisional hernia.  7. Intermittent paroxysmal atrial fibrillation with oral anticoagulation and sotalol therapy and subsequently DC cardioversion, February 2005; March 2005, with subsequent sustained atrial fibrillation Tikosyn therapy followed by EP study and successful superior pulmonary vein isolation, as well as ablation of incessant right atrial tachycardia, May 2006 with subsequent apparent dual chamber pacemaker implant, July 2007 with subsequent EP study and AV node ablation with acceptable EP follow up, February 2009 with abnormal pacemaker interrogation with 29% AMS without reprogramming, August 2011, January 2012 with subsequent repeat XAVI and extensive attempt at ablation of atrial fibrillation with development of persistent atrial flutter, June 2012 with contemplated DC cardioversion, September 2012.   8. Watchman device implanted September 2015 by Dr. Zelaya.  a. Apparent recent pulse generator change; data deficit.  b. XAVI, November 2016; a 24 mm Watchman device visualized within left atrial appendage, device appears secure, small amount of fluid around one edge of the device measuring approximately 4 mm. The velocities within the device appeared to be 10-20 mmHg.  No thrombus or fibrinous strands appear to be in association with the device.  LVEF 0.60  c. Pacemaker interrogation, June 2017; normal function, 100% mode switch, 95% RV paced; 9.5-10.5 years  on battery, normal pacemaker check in  November 2018 with no nodes switches  9. Post procedure hematuria with hospitalization for hypokalemia, anemia, uncontrolled hematuria requiring cystoscopy and apparent cauterization of bladder tear -- data deficit ([facility]), May 2006 with recent progressive apparent lower tract obstructive symptoms and contemplated TURP - data deficit, June 2010.   10. Remote operations:  a. Upper extremity lipoma removal.  b. [place] teeth extraction  11. Morbid obesity, BMI 41  12. Apparent severe anemia with transfusion and subsequent eight courses of weekly IV iron therapy - data deficit, autumn 2012.   13. Contemplated DC cardioversion, September 2012, with device interrogation demonstrating persistent atrial fibrillation with VVI rate of 40 with 100% ventricular paced, September 2013 with subsequent redo pulmonary vein isolation procedure, June 2012 with subsequent internal cardioversion of atrial fibrillation to normal sinus rhythm, September 2012 with ERAF.  14. Remote apparent acceptable colonoscopy with abnormal EGD demonstrating Vallecillo’s esophagitis with apparent esophageal carcinoma with subsequent endoscopic EGD and scheduled PET scan with contemplated laparoscopic surgical resection as well as repair of possible adjunctive repair of large incisional ventral hernia with contemplated cardiothoracic surgical consultation at [facility] - data deficit, August-September 2014 with subsequent radiotherapy and chemotherapy with subsequent exploratory laparoscopic surgery with EGD without finding of residual cancer - data deficit, [facility], January 2015 with subsequent marked GI bleeding and apparent gastric ulcer - data deficit, [facility], February 2015.   15. [facility] ED visit 1/6/18 for CHF symptoms with chest x-ray demonstrating cardiomegaly with pulmonary  edema      Allergies   Allergen Reactions   • Codeine    • Novocain [Procaine]          Current Outpatient Medications:   •  amLODIPine-benazepril (LOTREL) 10-20 MG per capsule, 2 (Two) Times a Day., Disp: , Rfl:   •  aspirin 81 MG EC tablet, Take 325 mg by mouth Daily., Disp: , Rfl:   •  BYSTOLIC 10 MG tablet, TAKE 1 TABLET BY MOUTH DAILY, Disp: 90 tablet, Rfl: 2  •  Calcium Carbonate-Vitamin D3 (CALCIUM 600+D3) 600-400 MG-UNIT tablet, Take  by mouth 2 (Two) Times a Day., Disp: , Rfl:   •  cetirizine (zyrTEC) 10 MG tablet, Take 10 mg by mouth Daily., Disp: , Rfl:   •  chlorthalidone (HYGROTON) 25 MG tablet, Take 1 tablet by mouth Daily., Disp: 90 tablet, Rfl: 4  •  esomeprazole (NexIUM) 40 MG capsule, Take 40 mg by mouth daily., Disp: , Rfl:   •  fenofibrate 160 MG tablet, 160 mg daily., Disp: , Rfl:   •  furosemide (LASIX) 40 MG tablet, Take 1 tablet by mouth Daily. May take an extra 20 mg PRN daily for increased edema or SOB (Patient taking differently: Take 20 mg by mouth Daily. May take an extra 20 mg PRN daily for increased edema or SOB), Disp: 45 tablet, Rfl: 11  •  magnesium oxide (MAGOX) 400 (241.3 Mg) MG tablet tablet, Take 400 mg by mouth 2 (Two) Times a Day., Disp: , Rfl:   •  potassium chloride (K-TAB) 20 MEQ tablet controlled-release ER tablet, Take 2 tablets by mouth 3 (Three) Times a Day With Meals., Disp: 270 tablet, Rfl: 4  •  Prenatal MV-Min-Fe Fum-FA-DHA (PRENATAL 1 PO), Take  by mouth Daily., Disp: , Rfl:   •  rosuvastatin (CRESTOR) 40 MG tablet, TAKE 1 TABLET BY MOUTH EVERY DAY, Disp: 90 tablet, Rfl: 3  •  sertraline (ZOLOFT) 50 MG tablet, 50 mg 2 (two) times a day., Disp: , Rfl:   •  tamsulosin (FLOMAX) 0.4 MG capsule 24 hr capsule, Take 1 capsule by mouth Every Night., Disp: , Rfl:     HISTORY OF PRESENT ILLNESS:  Patient is here for 6 month follow-up.  His echocardiogram in June 2018 was acceptable.  His device check was acceptable in November 2018 with no reprogramming.  He denies any chest  "pressure, palpitations, presyncope, or syncope.  He occasionally has shortness of breath and recently had laboratory testing and was found to have kypokalemia.  His Lasix was decreased to 20 mg daily, and he has noticed more bilateral lower extremity edema since it has been decreased.  He has had several urinary tract infections and is having a CT scan of his kidneys soon. He is rather sedentary due to back pain and does little walking.  His blood pressure at home is normally in the 140s systolic.  Patient otherwise denies chest pain, marked shortness of breath, PND, severe edema, palpitations, syncope or presyncope at this time.  He is accompanied to the office today by his wife.  He did have a flu shot this season.      Review of Systems   Constitution: Positive for weakness, malaise/fatigue and weight gain.   Cardiovascular: Positive for leg swelling.   Respiratory: Positive for shortness of breath.    Skin: Positive for dry skin.   Musculoskeletal: Positive for arthritis and back pain.   Gastrointestinal: Positive for constipation.      Obtained and otherwise negative except as outlined in problem list and HPI.    Procedures       Objective:       Vitals:    01/11/19 1506 01/11/19 1508   BP: 149/79 145/75   BP Location: Right arm Right arm   Patient Position: Sitting Standing   Pulse: 70 72   Weight: (!) 145 kg (319 lb)    Height: 188 cm (74\")    Recheck blood pressure right arm sitting was 138/70  Body mass index is 40.96 kg/m².  Last weight June 2018 was 308 pounds    Physical Exam   Constitutional: He is oriented to person, place, and time. He appears well-developed and well-nourished.   Neck: No JVD present. Carotid bruit is not present. No thyromegaly present.   Cardiovascular: Regular rhythm, S1 normal and S2 normal. Exam reveals distant heart sounds. Exam reveals no gallop, no S3 and no friction rub.   Murmur heard.   Medium-pitched early systolic murmur is present with a grade of 2/6 at the lower left " sternal border.  Pulses:       Dorsalis pedis pulses are 1+ on the right side, and 1+ on the left side.        Posterior tibial pulses are 1+ on the right side, and 1+ on the left side.   Pulmonary/Chest: Effort normal. He has decreased breath sounds. He has no wheezes. He has no rhonchi. He has no rales.   Abdominal: Soft. He exhibits no mass. There is no hepatosplenomegaly. There is no tenderness. There is no guarding.   Bowel sounds audible x4   Musculoskeletal: Normal range of motion. He exhibits edema (1+).   Lymphadenopathy:     He has no cervical adenopathy.   Neurological: He is alert and oriented to person, place, and time.   Skin: Skin is warm, dry and intact. No rash noted.   Vitals reviewed.        Lab Review:   Lab Results   Component Value Date    GLUCOSE 97 09/28/2015    BUN 14 01/17/2018    CREATININE 0.90 01/17/2018    EGFRIFNONA 83 01/17/2018    EGFRIFAFRI 101 01/17/2018    BCR 15.6 01/17/2018    CO2 30.0 01/17/2018    CALCIUM 9.2 01/17/2018       Lab Results   Component Value Date    WBC 8.16 09/28/2015    HGB 13.4 09/28/2015    HCT 41.5 09/28/2015    MCV 84.2 09/28/2015     09/28/2015       Lab Results   Component Value Date    HGBA1C 6.0 09/28/2015   · Echocardiogram 6/20/18:  · Left atrial cavity size is severely dilated.  · Mild-to-moderate mitral valve regurgitation is present.  · Mild to moderate tricuspid valve regurgitation is present.  · The following left ventricular wall segments are hypokinetic: apical septal, mid inferoseptal, apex hypokinetic and mid anteroseptal.  · Left ventricular systolic function is normal. Estimated EF = 58%.  · Right ventricular cavity is mildly dilated.  · There is no evidence of pericardial effusion.  · Mild pulmonary hypertension is present.  · Mild MAC is present.  · Pacemaker interrogation 11/14/18: Normal pacemaker function, battery longevity 10 years and 11 months, VVIR 70, ventricular pacing greater than 99%, no mode switching    · BMP, 1/7/19:  Sodium 144, potassium 2.9, chloride 102.7, carbon dioxide 31, BUN 17.2, creatinine 1, GFR greater than 59, glucose 129, calcium 8.7, hemoglobin A1c 7.1%    · BMP 1/10/19: sodium 142, potassium 3.2, chloride 100.8, carbon dioxide 30, BUN 18, creatinine 0.8, GFR greater than 59, glucose 137, calcium 9      Assessment:   Overall continued acceptable course with no interim cardiopulmonary complaints with fair functional status on limited activity. We will defer additional diagnostic or therapeutic intervention from a cardiac perspective at this time other than to adjust his treatment for congestive heart failure and hypertension.  His blood pressure is elevated so we will add eplerenone 25 mg daily and decrease his chlorthalidone to 12.5 mg daily.  We will change his furosemide to torsemide 20 mg daily.  We will also add oromag 400 mg daily.  He will need a BMP, magnesium, and recheck of his blood pressure in 2 weeks and have the results called to us.     Diagnosis Plan   1. Coronary artery disease involving native coronary artery of native heart without angina pectoris  Stable; No recurrent angina pectoris or CHF on current activity schedule; continue current treatment     2. Essential hypertension  Eplerenone 25 mg daily, decreased chlorthalidone 12.5 mg daily, change Lasix torsemide 20 mg daily, oromag 400 mg daily, BMP, magnesium, and blood pressure recheck in 2 weeks with results called to us    3. Chronic atrial fibrillation (CMS/HCC)  Stable   4. Dyslipidemia  No new labs to review          Plan:         1. Patient to continue current medications and close follow up with the above providers with the following alterations:   A. Initiate eplerenone 25 mg daily   B. Alter chlorthalidone dose to 12.5 mg daily   C. Alter furosemide to torsemide 20 mg daily   D. Initiate oromag 400 mg daily  2. Tentative cardiology follow up in July 2019, or patient may return sooner PRN.  3. BMP, magnesium level, and blood pressure  check in 2 weeks with results called to us    Scribed for Cornelio Jimenez MD by Luisana Moore, [facility]. 1/11/2019  3:29 PM    I, Cornelio Jimenez MD, [facility], personally performed the services described in this documentation as scribed by the above named individual in my presence, and it is both accurate and complete. At 3:51 PM on 01/11/2019

## 2019-01-16 ENCOUNTER — RESULTS ENCOUNTER (OUTPATIENT)
Dept: CARDIOLOGY | Facility: CLINIC | Age: 74
End: 2019-01-16

## 2019-01-16 DIAGNOSIS — I10 ESSENTIAL HYPERTENSION: ICD-10-CM

## 2019-01-16 DIAGNOSIS — I25.10 CORONARY ARTERY DISEASE INVOLVING NATIVE CORONARY ARTERY OF NATIVE HEART WITHOUT ANGINA PECTORIS: ICD-10-CM

## 2019-01-28 ENCOUNTER — CLINICAL SUPPORT NO REQUIREMENTS (OUTPATIENT)
Dept: CARDIOLOGY | Facility: CLINIC | Age: 74
End: 2019-01-28

## 2019-01-28 DIAGNOSIS — I48.21 PERMANENT ATRIAL FIBRILLATION (HCC): Primary | ICD-10-CM

## 2019-02-14 ENCOUNTER — CLINICAL SUPPORT NO REQUIREMENTS (OUTPATIENT)
Dept: CARDIOLOGY | Facility: CLINIC | Age: 74
End: 2019-02-14

## 2019-02-14 DIAGNOSIS — I48.20 CHRONIC ATRIAL FIBRILLATION (HCC): ICD-10-CM

## 2019-02-14 PROCEDURE — 93294 REM INTERROG EVL PM/LDLS PM: CPT | Performed by: INTERNAL MEDICINE

## 2019-02-14 PROCEDURE — 93296 REM INTERROG EVL PM/IDS: CPT | Performed by: INTERNAL MEDICINE

## 2019-04-18 ENCOUNTER — OFFICE VISIT (OUTPATIENT)
Dept: CARDIOLOGY | Facility: CLINIC | Age: 74
End: 2019-04-18

## 2019-04-18 VITALS
WEIGHT: 295 LBS | BODY MASS INDEX: 37.86 KG/M2 | HEART RATE: 72 BPM | DIASTOLIC BLOOD PRESSURE: 72 MMHG | HEIGHT: 74 IN | SYSTOLIC BLOOD PRESSURE: 128 MMHG

## 2019-04-18 DIAGNOSIS — I48.20 CHRONIC ATRIAL FIBRILLATION (HCC): Primary | ICD-10-CM

## 2019-04-18 DIAGNOSIS — I25.10 CORONARY ARTERY DISEASE INVOLVING NATIVE CORONARY ARTERY OF NATIVE HEART WITHOUT ANGINA PECTORIS: ICD-10-CM

## 2019-04-18 DIAGNOSIS — I10 ESSENTIAL HYPERTENSION: ICD-10-CM

## 2019-04-18 PROCEDURE — 99213 OFFICE O/P EST LOW 20 MIN: CPT | Performed by: PHYSICIAN ASSISTANT

## 2019-04-18 PROCEDURE — 93280 PM DEVICE PROGR EVAL DUAL: CPT | Performed by: PHYSICIAN ASSISTANT

## 2019-04-18 NOTE — PROGRESS NOTES
Markus Allen  1945  [phone]    04/18/2019    [facility] CARDIOLOGY     Don Patel MD  [address] [place] [id number]    Chief Complaint   Patient presents with   • Atrial Fibrillation     Problem List:  1. Ischemic heart disease:  a. Remote intermittent recurrent CCS class III-IV angina pectoris with abnormal positive intravenous adenosine SPECT/thallium-201 study/abnormal EKG with 3-vessel coronary atherosclerosis with severe 2-vessel branch involvement/acceptable left ventricular function, LVEF (0.70), December 1997.  b. Acceptable nondiagnostic borderline GXT, LVEF (0.72), July 2003.  c. Paroxysmal atrial fibrillation with abnormal myocardial perfusion study and subsequent catheterization studies demonstrating diffuse 3-vessel coronary atherosclerosis with 90% stenosis at origin of the mid portion of second obtuse marginal branch and nondominant left circumflex coronary artery treated with angioplasty and stent deployment using drug eluting stent with acceptable left ventricular function, LVEF (0.62), January 2005.  d. Abnormal IV Adenowalk Quantitative SPECT gated Cardiolite study, LVEF (0.65), April 2010.  e. Residual CCS class I angina pectoris/NYHA class II to III exertional dyspnea and fatigue syndrome with recent [facility] ED evaluation for probable congestive heart failure, January 2018.  f. Echocardiogram 6/20/18: LA severely dilated, mild to moderate MR, mild-to-moderate TR, the following LV wall segments are hypokinetic: Apical septal, mid inferoseptal, apex hypokinetic and mid anteroseptal.  LVEF 0.58, RV mildly dilated, no pericardial effusion, mild pulmonary hypertension, mild MAC present   g. CCS  class I chest discomfort/NYHA class I-II URIBE symptoms.  2. Severe hypertension/probably essential with remote normal selective bilateral renal arteriography, December 1997.  3. Dyslipidemia.  4. Remote tobacco abuse, resolved.  5. Abnormal chest  x-ray.  6. Remote abnormal colonoscopy with findings of malignant polyp with subsequent sigmoid partial colectomy--data deficit, July 2014 with development of severe large incisional hernia.  7. Intermittent paroxysmal atrial fibrillation with oral anticoagulation and sotalol therapy and subsequently DC cardioversion, February 2005; March 2005, with subsequent sustained atrial fibrillation Tikosyn therapy followed by EP study and successful superior pulmonary vein isolation, as well as ablation of incessant right atrial tachycardia, May 2006 with subsequent apparent dual chamber pacemaker implant, July 2007 with subsequent EP study and AV node ablation with acceptable EP follow up, February 2009 with abnormal pacemaker interrogation with 29% AMS without reprogramming, August 2011, January 2012 with subsequent repeat XAVI and extensive attempt at ablation of atrial fibrillation with development of persistent atrial flutter, June 2012 with contemplated DC cardioversion, September 2012.   8. Watchman device implanted September 2015 by Dr. Zelaya.  a. Apparent recent pulse generator change; data deficit.  b. XAVI, November 2016; a 24 mm Watchman device visualized within left atrial appendage, device appears secure, small amount of fluid around one edge of the device measuring approximately 4 mm. The velocities within the device appeared to be 10-20 mmHg.  No thrombus or fibrinous strands appear to be in association with the device.  LVEF 0.60  c. Pacemaker interrogation, June 2017; normal function, 100% mode switch, 95% RV paced; 9.5-10.5 years on battery, normal pacemaker check in  November 2018 with no nodes switches  9. Post procedure hematuria with hospitalization for hypokalemia, anemia, uncontrolled hematuria requiring cystoscopy and apparent cauterization of bladder tear -- data deficit ([facility]), May 2006 with recent progressive apparent lower tract obstructive symptoms and contemplated TURP  - data deficit, June 2010.   10. Remote operations:  a. Upper extremity lipoma removal.  b. [place] teeth extraction  11. Morbid obesity, BMI 41  12. Apparent severe anemia with transfusion and subsequent eight courses of weekly IV iron therapy - data deficit, autumn 2012.   13. Contemplated DC cardioversion, September 2012, with device interrogation demonstrating persistent atrial fibrillation with VVI rate of 40 with 100% ventricular paced, September 2013 with subsequent redo pulmonary vein isolation procedure, June 2012 with subsequent internal cardioversion of atrial fibrillation to normal sinus rhythm, September 2012 with ERAF.  14. Remote apparent acceptable colonoscopy with abnormal EGD demonstrating Vallecillo’s esophagitis with apparent esophageal carcinoma with subsequent endoscopic EGD and scheduled PET scan with contemplated laparoscopic surgical resection as well as repair of possible adjunctive repair of large incisional ventral hernia with contemplated cardiothoracic surgical consultation at [facility] - data deficit, August-September 2014 with subsequent radiotherapy and chemotherapy with subsequent exploratory laparoscopic surgery with EGD without finding of residual cancer - data deficit, [facility], January 2015 with subsequent marked GI bleeding and apparent gastric ulcer - data deficit, [facility], February 2015.   15. [facility] ED visit 1/6/18 for CHF symptoms with chest x-ray demonstrating cardiomegaly with pulmonary edema          Allergies  Allergies   Allergen Reactions   • Codeine    • Novocain [Procaine]        Current Medications    Current Outpatient Medications:   •  amLODIPine-benazepril (LOTREL) 10-20 MG per capsule, 2 (Two) Times a Day., Disp: , Rfl:   •  aspirin 81 MG EC tablet, Take 325 mg by mouth Daily., Disp: , Rfl:   •  BYSTOLIC 10 MG tablet, TAKE 1 TABLET BY MOUTH DAILY, Disp: 90 tablet,  Rfl: 2  •  Calcium Carbonate-Vitamin D3 (CALCIUM 600+D3) 600-400 MG-UNIT tablet, Take  by mouth 2 (Two) Times a Day., Disp: , Rfl:   •  cetirizine (zyrTEC) 10 MG tablet, Take 10 mg by mouth Daily., Disp: , Rfl:   •  chlorthalidone (HYGROTON) 25 MG tablet, Take 0.5 tablets by mouth Daily., Disp: 45 tablet, Rfl: 3  •  eplerenone (INSPRA) 25 MG tablet, Take 1 tablet by mouth Daily., Disp: 90 tablet, Rfl: 2  •  esomeprazole (NexIUM) 40 MG capsule, Take 40 mg by mouth daily., Disp: , Rfl:   •  fenofibrate 160 MG tablet, 160 mg daily., Disp: , Rfl:   •  magnesium oxide (MAGOX) 400 (241.3 Mg) MG tablet tablet, Take 400 mg by mouth 2 (Two) Times a Day., Disp: , Rfl:   •  nitroglycerin (NITROSTAT) 0.4 MG SL tablet, 1 under the tongue as needed for angina, may repeat q5mins for up three doses, Disp: 100 tablet, Rfl: 1  •  potassium chloride (K-TAB) 20 MEQ tablet controlled-release ER tablet, Take 2 tablets by mouth 3 (Three) Times a Day With Meals., Disp: 270 tablet, Rfl: 4  •  Prenatal MV-Min-Fe Fum-FA-DHA (PRENATAL 1 PO), Take  by mouth Daily., Disp: , Rfl:   •  rosuvastatin (CRESTOR) 40 MG tablet, TAKE 1 TABLET BY MOUTH EVERY DAY, Disp: 90 tablet, Rfl: 3  •  sertraline (ZOLOFT) 50 MG tablet, 50 mg 2 (two) times a day., Disp: , Rfl:   •  tamsulosin (FLOMAX) 0.4 MG capsule 24 hr capsule, Take 1 capsule by mouth Every Night., Disp: , Rfl:   •  torsemide (DEMADEX) 20 MG tablet, Take 1 tablet by mouth Daily., Disp: 90 tablet, Rfl: 3  •  Magnesium 400 MG capsule, Take 400 mg by mouth Daily., Disp: 90 capsule, Rfl: 1    History of Present Illness     Pt presents for follow up of chronic atrial fibrillation, AVB s/p AVN ablation with PM check, HTN, and CAD.  Since we last saw the pt,  He has overall been stable. He denies awareness of AFIB. He has some mild SOB with exertion which is unchanged. He denies anginal type CP, LH, and dizziness. Denies any hospitalizations, ER visits, bleeding on ASA, or TIA/CVA symptoms. Overall feels  "well. 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 His main complaint is back problems and issues with umbilical hernias.     ROS:  General:  + fatigue, - weight gain or loss  Cardiovascular:  Denies CP, PND, syncope, near syncope, +mild chronic edema - palpitations.  Pulmonary:  Denies URIBE, cough, or wheezing      Vitals:    04/18/19 1025   BP: 128/72   BP Location: Left arm   Patient Position: Sitting   Pulse: 72   Weight: 134 kg (295 lb)   Height: 188 cm (74\")     Body mass index is 37.88 kg/m².  PE:  General: NAD. A & O x 3  Neck: no JVD, no carotid bruits, no TM  Heart RRR, NL S1, S2, S4 present, no rubs, murmurs  Lungs: CTA, no wheezes, rhonchi, or rales  Abd: soft, obese, non-tender, NL BS.   Ext: No musculoskeletal deformities, +  Mild ankle edema, cyanosis, or clubbing  Psych: normal mood and affect    Diagnostic Data:    PM check manual interrogation: normal function. Set at VVIR. 10.6-11.2 years on battery.     Procedures    1. Chronic atrial fibrillation (CMS/HCC)    2. Essential hypertension    3. Coronary artery disease involving native coronary artery of native heart without angina pectoris          Plan:  1) CAF RFA of AVN: NL Pm fxn asymptomatic. Echocardiogram 6/2018 EF 58%.   2)  Anticoagulation s/p ROSHAN occlusion on ASA.   3) HTN:  controlled on current regimen. Recently adjusted by Dr. Jimenez in January.   Wt loss, exercise, salt reduction  4) CAD: per Dr Jimenez        F/up in 6 months    [facility]  4/18/2019   11:13 AM    "

## 2019-05-30 ENCOUNTER — CLINICAL SUPPORT NO REQUIREMENTS (OUTPATIENT)
Dept: CARDIOLOGY | Facility: CLINIC | Age: 74
End: 2019-05-30

## 2019-05-30 DIAGNOSIS — R00.1 SINUS BRADYCARDIA: Primary | ICD-10-CM

## 2019-05-30 DIAGNOSIS — I48.20 CHRONIC ATRIAL FIBRILLATION (HCC): ICD-10-CM

## 2019-05-30 PROCEDURE — 93295 DEV INTERROG REMOTE 1/2/MLT: CPT | Performed by: INTERNAL MEDICINE

## 2019-05-30 PROCEDURE — 93296 REM INTERROG EVL PM/IDS: CPT | Performed by: INTERNAL MEDICINE

## 2019-06-03 RX ORDER — NEBIVOLOL HYDROCHLORIDE 10 MG/1
TABLET ORAL
Qty: 90 TABLET | Refills: 3 | Status: SHIPPED | OUTPATIENT
Start: 2019-06-03 | End: 2020-06-01

## 2019-07-24 ENCOUNTER — OFFICE VISIT (OUTPATIENT)
Dept: CARDIOLOGY | Facility: CLINIC | Age: 74
End: 2019-07-24

## 2019-07-24 VITALS
HEART RATE: 71 BPM | WEIGHT: 295 LBS | BODY MASS INDEX: 37.86 KG/M2 | HEIGHT: 74 IN | DIASTOLIC BLOOD PRESSURE: 76 MMHG | SYSTOLIC BLOOD PRESSURE: 135 MMHG

## 2019-07-24 DIAGNOSIS — I25.10 CORONARY ARTERY DISEASE INVOLVING NATIVE CORONARY ARTERY OF NATIVE HEART WITHOUT ANGINA PECTORIS: Primary | ICD-10-CM

## 2019-07-24 DIAGNOSIS — I10 ESSENTIAL HYPERTENSION: ICD-10-CM

## 2019-07-24 DIAGNOSIS — E78.5 DYSLIPIDEMIA: ICD-10-CM

## 2019-07-24 DIAGNOSIS — I48.20 CHRONIC ATRIAL FIBRILLATION (HCC): ICD-10-CM

## 2019-07-24 PROCEDURE — 99214 OFFICE O/P EST MOD 30 MIN: CPT | Performed by: INTERNAL MEDICINE

## 2019-07-24 RX ORDER — FINASTERIDE 5 MG/1
5 TABLET, FILM COATED ORAL DAILY
Refills: 3 | COMMUNITY
Start: 2019-06-07

## 2019-07-24 NOTE — PROGRESS NOTES
Subjective:     Encounter Date:07/24/2019    Patient ID: Markus Allen is a 73 y.o.  white male, [facility] retired /retired [employer] , from [place], [place].      REFERRING PHYSICIAN/INTERNIST: Maxi Walker MD  [place] CARDIOLOGIST: Sami Bernal MD, [facility]  INTERVENTIONAL CARDIOLOGIST: Dahval Joaquin MD, [facility], [facility]  ELECTROPHYSIOLOGIST: Nando Zelaya MD, [facility], [facility]  UROLOGIST: Bennett Garcia III, MD  HEMATOLOGIST: Dheeraj Knott MD   CURRENT PHYSICIAN:  Edita Hall MD    Chief Complaint:   Chief Complaint   Patient presents with   • Coronary Artery Disease   • Ischemic heart disease   • Atrial Fibrillation     Problem List:  1. Ischemic heart disease:  a. Remote intermittent recurrent CCS class III-IV angina pectoris with abnormal positive intravenous adenosine SPECT/thallium-201 study/abnormal EKG with 3-vessel coronary atherosclerosis with severe 2-vessel branch involvement/acceptable left ventricular function, LVEF (0.70), December 1997.  b. Acceptable nondiagnostic borderline GXT, LVEF (0.72), July 2003.  c. Paroxysmal atrial fibrillation with abnormal myocardial perfusion study and subsequent catheterization studies demonstrating diffuse 3-vessel coronary atherosclerosis with 90% stenosis at origin of the mid portion of second obtuse marginal branch and nondominant left circumflex coronary artery treated with angioplasty and stent deployment using drug eluting stent with acceptable left ventricular function, LVEF (0.62), January 2005.  d. Abnormal IV Adenowalk Quantitative SPECT gated Cardiolite study, LVEF (0.65), April 2010.  e. Residual CCS class I angina pectoris/NYHA class II to III exertional dyspnea and fatigue syndrome with recent [facility] ED evaluation for probable congestive heart failure, January 2018.  f. Echocardiogram 6/20/18: LA severely dilated, mild to moderate MR, mild-to-moderate TR, the following LV wall segments are  hypokinetic: Apical septal, mid inferoseptal, apex hypokinetic and mid anteroseptal.  LVEF 0.58, RV mildly dilated, no pericardial effusion, mild pulmonary hypertension, mild MAC present   g. CCS  class I chest discomfort/NYHA class I-II URIBE symptoms.  2. Severe hypertension/probably essential with remote normal selective bilateral renal arteriography, December 1997.  3. Dyslipidemia.  4. Remote tobacco abuse, resolved.  5. Abnormal chest x-ray.  6. Remote abnormal colonoscopy with findings of malignant polyp with subsequent sigmoid partial colectomy--data deficit, July 2014 with development of severe large incisional hernia.  7. Intermittent paroxysmal atrial fibrillation with oral anticoagulation and sotalol therapy and subsequently DC cardioversion, February 2005; March 2005, with subsequent sustained atrial fibrillation Tikosyn therapy followed by EP study and successful superior pulmonary vein isolation, as well as ablation of incessant right atrial tachycardia, May 2006 with subsequent apparent dual chamber pacemaker implant, July 2007 with subsequent EP study and AV node ablation with acceptable EP follow up, February 2009 with abnormal pacemaker interrogation with 29% AMS without reprogramming, August 2011, January 2012 with subsequent repeat XAVI and extensive attempt at ablation of atrial fibrillation with development of persistent atrial flutter, June 2012 with contemplated DC cardioversion, September 2012.   8. Watchman device implanted September 2015 by Dr. Zelaya.  a. Apparent recent pulse generator change; data deficit.  b. XAVI, November 2016; a 24 mm Watchman device visualized within left atrial appendage, device appears secure, small amount of fluid around one edge of the device measuring approximately 4 mm. The velocities within the device appeared to be 10-20 mmHg.  No thrombus or fibrinous strands appear to be in association with the device.  LVEF 0.60  c. Pacemaker interrogation, June 2017;  normal function, 100% mode switch, 95% RV paced; 9.5-10.5 years on battery, normal pacemaker check in  November 2018 with no nodes switches  9. Post procedure hematuria with hospitalization for hypokalemia, anemia, uncontrolled hematuria requiring cystoscopy and apparent cauterization of bladder tear -- data deficit ([facility]), May 2006 with progressive apparent lower tract obstructive symptoms and contemplated TURP - data deficit, June 2010.   10. Remote operations:  a. Upper extremity lipoma removal.  b. [place] teeth extraction  11. Morbid obesity, BMI 41; resolved, now moderate obesity, BMI 37.9  12. Apparent severe anemia with transfusion and subsequent eight courses of weekly IV iron therapy - data deficit, autumn 2012.   13. Contemplated DC cardioversion, September 2012, with device interrogation demonstrating persistent atrial fibrillation with VVI rate of 40 with 100% ventricular paced, September 2013 with subsequent redo pulmonary vein isolation procedure, June 2012 with subsequent internal cardioversion of atrial fibrillation to normal sinus rhythm, September 2012 with ERAF.  14. Remote apparent acceptable colonoscopy with abnormal EGD demonstrating Vallecillo’s esophagitis with apparent esophageal carcinoma with subsequent endoscopic EGD and scheduled PET scan with contemplated laparoscopic surgical resection as well as repair of possible adjunctive repair of large incisional ventral hernia with contemplated cardiothoracic surgical consultation at [facility] - data deficit, August-September 2014 with subsequent radiotherapy and chemotherapy with subsequent exploratory laparoscopic surgery with EGD without finding of residual cancer - data deficit, [facility], January 2015 with subsequent marked GI bleeding and apparent gastric ulcer - data deficit, [facility], February 2015.   15. [facility] ED visit  1/6/18 for CHF symptoms with chest x-ray demonstrating cardiomegaly with pulmonary edema  16. Type 2 diabetes mellitus, hemoglobin A1c 6.9%, April 2019  17. Melanoma excision right face, summer 2019 - data deficit        Allergies   Allergen Reactions   • Codeine    • Novocain [Procaine]          Current Outpatient Medications:   •  amLODIPine-benazepril (LOTREL) 10-20 MG per capsule, 2 (Two) Times a Day., Disp: , Rfl:   •  aspirin 81 MG EC tablet, Take 325 mg by mouth Daily., Disp: , Rfl:   •  BYSTOLIC 10 MG tablet, TAKE 1 TABLET BY MOUTH DAILY, Disp: 90 tablet, Rfl: 3  •  Calcium Carbonate-Vitamin D3 (CALCIUM 600+D3) 600-400 MG-UNIT tablet, Take  by mouth 2 (Two) Times a Day., Disp: , Rfl:   •  cetirizine (zyrTEC) 10 MG tablet, Take 10 mg by mouth Daily., Disp: , Rfl:   •  chlorthalidone (HYGROTON) 25 MG tablet, Take 0.5 tablets by mouth Daily., Disp: 45 tablet, Rfl: 3  •  eplerenone (INSPRA) 25 MG tablet, Take 1 tablet by mouth Daily., Disp: 90 tablet, Rfl: 2  •  esomeprazole (NexIUM) 40 MG capsule, Take 40 mg by mouth daily., Disp: , Rfl:   •  fenofibrate 160 MG tablet, 160 mg daily., Disp: , Rfl:   •  finasteride (PROSCAR) 5 MG tablet, Take 5 mg by mouth Daily., Disp: , Rfl: 3  •  Magnesium 400 MG capsule, Take 400 mg by mouth Daily., Disp: 90 capsule, Rfl: 1  •  nitroglycerin (NITROSTAT) 0.4 MG SL tablet, 1 under the tongue as needed for angina, may repeat q5mins for up three doses, Disp: 100 tablet, Rfl: 1  •  potassium chloride (K-TAB) 20 MEQ tablet controlled-release ER tablet, Take 2 tablets by mouth 3 (Three) Times a Day With Meals., Disp: 270 tablet, Rfl: 4  •  rosuvastatin (CRESTOR) 40 MG tablet, TAKE 1 TABLET BY MOUTH EVERY DAY, Disp: 90 tablet, Rfl: 3  •  sertraline (ZOLOFT) 50 MG tablet, 50 mg 2 (two) times a day., Disp: , Rfl:   •  tamsulosin (FLOMAX) 0.4 MG capsule 24 hr capsule, Take 1 capsule by mouth Every Night., Disp: , Rfl:   •  torsemide (DEMADEX) 20 MG tablet, Take 1 tablet by mouth Daily.,  "Disp: 90 tablet, Rfl: 3    HISTORY OF PRESENT ILLNESS: Patient returns for scheduled 6-month followup. He denies any chest pressure, palpitations, increased shortness of breath edema, dizziness, presyncope, or syncope.  He is planning to have new laboratory studies in August 2019 and will fax those results to us when available.  He has lower extremity edema but feels that this is stable.  He was last seen in April 2019 by Sarah Rosa PA-C and had an acceptable device interrogation at that time.  The patient has a known renal tumor and is scheduled to have another scan soon.  He had questionable skin lesions on his face and went to a dermatologist and was found to have a melanoma on the right side; this was recently excised.  The patient has a ventral hernia and is considering talking to somebody about having it removed because it causes him back problems; he previously was referred to a surgeon at [facility] for this problem but canceled the appointment.  He also has complaints of constipation and sometimes has to use laxatives.  Patient otherwise denies chest pain, shortness of breath, PND, edema, palpitations, syncope or presyncope at this time on limited activity.      Review of Systems   Constitution: Positive for malaise/fatigue and weight gain.   Cardiovascular: Positive for leg swelling.   Skin: Positive for dry skin, itching and skin cancer.   Musculoskeletal: Positive for arthritis and back pain.   Gastrointestinal: Positive for constipation.   Neurological: Positive for loss of balance.      All other systems reviewed and otherwise negative.    Procedures       Objective:       Vitals:    07/24/19 1051 07/24/19 1053   BP: 144/80 135/76   BP Location: Left arm Left arm   Patient Position: Sitting Standing   Pulse: 70 71   Weight: 134 kg (295 lb)    Height: 188 cm (74\")    Recheck blood pressure left arm sitting was 128/64  Body mass index is 37.88 kg/m².   Last weight:  319 lbs.    Physical Exam "   Constitutional: He is oriented to person, place, and time. He appears well-developed and well-nourished.   Neck: No JVD present. Carotid bruit is not present. No thyromegaly present.   Cardiovascular: Regular rhythm, S1 normal and S2 normal. Exam reveals distant heart sounds. Exam reveals no gallop, no S3 and no friction rub.   Murmur heard.   Medium-pitched harsh early systolic murmur is present with a grade of 2/6 at the lower left sternal border.  Pulses:       Carotid pulses are 1+ on the right side, and 1+ on the left side.       Radial pulses are 1+ on the right side, and 1+ on the left side.        Femoral pulses are 1+ on the right side, and 1+ on the left side.       Popliteal pulses are 1+ on the right side, and 1+ on the left side.        Dorsalis pedis pulses are 1+ on the right side, and 1+ on the left side.        Posterior tibial pulses are 1+ on the right side, and 1+ on the left side.   Pulmonary/Chest: Effort normal and breath sounds normal. He has no wheezes. He has no rhonchi. He has no rales.   Left precordial pacemaker site is nominal   Abdominal: Soft. He exhibits no mass. There is no hepatosplenomegaly. There is no tenderness. There is no guarding.   Bowel sounds audible x4   Musculoskeletal: Normal range of motion. He exhibits edema (1+).   Lymphadenopathy:     He has no cervical adenopathy.   Neurological: He is alert and oriented to person, place, and time.   Skin: Skin is warm, dry and intact. No rash noted.   Vitals reviewed.        Lab Review:   04/22/2019 (reviewed with patient by letter - Jardiance recommended):  · BMP - normal electrolytes, serum creatinine 0.8, BUN 16, glucose 127, serum calcium 9.1  · Hemoglobin A1c - 6.9%, average mean glucose 151 mg/dL  · No FLP drawn    Lab Results   Component Value Date    GLUCOSE 97 09/28/2015    BUN 14 01/17/2018    CREATININE 0.90 01/17/2018    EGFRIFNONA 83 01/17/2018    EGFRIFAFRI 101 01/17/2018    BCR 15.6 01/17/2018    CO2 30.0  01/17/2018    CALCIUM 9.2 01/17/2018       Lab Results   Component Value Date    WBC 8.16 09/28/2015    HGB 13.4 09/28/2015    HCT 41.5 09/28/2015    MCV 84.2 09/28/2015     09/28/2015       Lab Results   Component Value Date    HGBA1C 6.0 09/28/2015           Assessment:   Overall continued acceptable course with no interim cardiopulmonary complaints with acceptable functional status on limited activity. We will defer additional diagnostic or therapeutic intervention from a cardiac perspective at this time.  He will have a device interrogation in October 2019 with Dr. Zelaya.  The patient will have his next laboratory results faxed to us for review.  He is scheduled to have these in August 2019.       Diagnosis Plan   1. Coronary artery disease involving native coronary artery of native heart without angina pectoris   Stable, continue aspirin, Bystolic, Lo-Trol, chlorthalidone, torsemide, eplerenone; No recurrent angina pectoris or CHF on current activity schedule; continue current treatment     2. Essential hypertension   Controlled, continue current cardiac medications   3. Chronic atrial fibrillation (CMS/HCC)   Acceptable device interrogation April 2019 with follow-up with Dr. Zelaya approximately October 2019, is status post watchman device   4. Dyslipidemia   No new labs to review, patient will fax his next laboratory results in August 2019          Plan:         1. Patient to continue current medications and close follow up with the above providers.  2. Tentative cardiology follow up in January 2020, or patient may return sooner PRN.    Scribed for Cornelio Jimenez MD by Luisana Moore, [facility]. 7/24/2019  11:29 AM    I, Cornelio Jimenez MD, [facility], personally performed the services described in this documentation as scribed by the above named individual in my presence, and it is both accurate and complete. At 11:45 AM on 07/24/2019.

## 2019-08-29 ENCOUNTER — CLINICAL SUPPORT NO REQUIREMENTS (OUTPATIENT)
Dept: CARDIOLOGY | Facility: CLINIC | Age: 74
End: 2019-08-29

## 2019-08-29 DIAGNOSIS — I48.19 PERSISTENT ATRIAL FIBRILLATION (HCC): ICD-10-CM

## 2019-08-29 PROCEDURE — 93296 REM INTERROG EVL PM/IDS: CPT | Performed by: INTERNAL MEDICINE

## 2019-08-29 PROCEDURE — 93294 REM INTERROG EVL PM/LDLS PM: CPT | Performed by: INTERNAL MEDICINE

## 2019-10-14 DIAGNOSIS — I10 ESSENTIAL HYPERTENSION: ICD-10-CM

## 2019-10-14 DIAGNOSIS — I25.10 CORONARY ARTERY DISEASE INVOLVING NATIVE CORONARY ARTERY OF NATIVE HEART WITHOUT ANGINA PECTORIS: ICD-10-CM

## 2019-10-14 RX ORDER — EPLERENONE 25 MG/1
25 TABLET, FILM COATED ORAL DAILY
Qty: 90 TABLET | Refills: 3 | Status: SHIPPED | OUTPATIENT
Start: 2019-10-14 | End: 2020-10-16

## 2020-01-08 DIAGNOSIS — I25.10 CORONARY ARTERY DISEASE INVOLVING NATIVE CORONARY ARTERY OF NATIVE HEART WITHOUT ANGINA PECTORIS: ICD-10-CM

## 2020-01-08 RX ORDER — TORSEMIDE 20 MG/1
20 TABLET ORAL DAILY
Qty: 90 TABLET | Refills: 1 | Status: SHIPPED | OUTPATIENT
Start: 2020-01-08 | End: 2020-07-14

## 2020-02-05 ENCOUNTER — OFFICE VISIT (OUTPATIENT)
Dept: CARDIOLOGY | Facility: CLINIC | Age: 75
End: 2020-02-05

## 2020-02-05 VITALS
SYSTOLIC BLOOD PRESSURE: 143 MMHG | HEART RATE: 70 BPM | WEIGHT: 313 LBS | BODY MASS INDEX: 40.17 KG/M2 | HEIGHT: 74 IN | DIASTOLIC BLOOD PRESSURE: 78 MMHG

## 2020-02-05 DIAGNOSIS — E78.5 DYSLIPIDEMIA: ICD-10-CM

## 2020-02-05 DIAGNOSIS — I10 ESSENTIAL HYPERTENSION: ICD-10-CM

## 2020-02-05 DIAGNOSIS — I25.10 CORONARY ARTERY DISEASE INVOLVING NATIVE CORONARY ARTERY OF NATIVE HEART WITHOUT ANGINA PECTORIS: Primary | ICD-10-CM

## 2020-02-05 DIAGNOSIS — I48.20 CHRONIC ATRIAL FIBRILLATION (HCC): ICD-10-CM

## 2020-02-05 PROCEDURE — 99214 OFFICE O/P EST MOD 30 MIN: CPT | Performed by: INTERNAL MEDICINE

## 2020-02-05 RX ORDER — CHLORTHALIDONE 25 MG/1
25 TABLET ORAL DAILY
Qty: 90 TABLET | Refills: 1 | Status: SHIPPED | OUTPATIENT
Start: 2020-02-05 | End: 2021-03-16

## 2020-02-05 NOTE — PROGRESS NOTES
Subjective:     Encounter Date:02/05/2020    Patient ID: Markus Allen is a 74 y.o.  white male, [facility] retired /retired [employer] , from [place], [place].      REFERRING PHYSICIAN/INTERNIST: Maxi Walker MD  [place] CARDIOLOGIST: Sami Bernal MD, [facility]  INTERVENTIONAL CARDIOLOGIST: Dhaval Joaquin MD, [facility], [facility] [place] [facility]  ELECTROPHYSIOLOGIST: Nando Zelaya MD, [facility], [facility]  UROLOGIST: Bennett Garcia III, MD  HEMATOLOGIST: Dheeraj Knott MD   CURRENT PHYSICIAN:  Edita Hall MD  RADIOLOGIST: Kings Hooks MD    Chief Complaint:   Chief Complaint   Patient presents with   • Coronary Artery Disease   • Ischemic heart disease     Problem List:  1. Ischemic heart disease:  a. Remote intermittent recurrent CCS class III-IV angina pectoris with abnormal positive intravenous adenosine SPECT/thallium-201 study/abnormal EKG with 3-vessel coronary atherosclerosis with severe 2-vessel branch involvement/acceptable left ventricular function, LVEF (0.70), December 1997.  b. Acceptable nondiagnostic borderline GXT, LVEF (0.72), July 2003.  c. Paroxysmal atrial fibrillation with abnormal myocardial perfusion study and subsequent catheterization studies demonstrating diffuse 3-vessel coronary atherosclerosis with 90% stenosis at origin of the mid portion of second obtuse marginal branch and nondominant left circumflex coronary artery treated with angioplasty and stent deployment using drug eluting stent with acceptable left ventricular function, LVEF (0.62), January 2005.  d. Abnormal IV [place] Quantitative SPECT gated Cardiolite study, LVEF (0.65), April 2010.  e. Residual CCS class I angina pectoris/NYHA class II to III exertional dyspnea and fatigue syndrome with recent [facility] ED evaluation for probable congestive heart failure, January 2018.  f. Echocardiogram 6/20/18: LA severely dilated, mild to moderate MR, mild-to-moderate TR, the following LV wall segments are  hypokinetic: Apical septal, mid inferoseptal, apex hypokinetic and mid anteroseptal.  LVEF 0.58, RV mildly dilated, no pericardial effusion, mild pulmonary hypertension, mild MAC present   g. CCS  class I chest discomfort/NYHA class I-II URIBE symptoms.  2. Severe hypertension/probably essential with remote normal selective bilateral renal arteriography, December 1997.  3. Dyslipidemia.  4. Remote tobacco abuse, resolved.  5. Abnormal chest x-ray.  6. Remote abnormal colonoscopy with findings of malignant polyp with subsequent sigmoid partial colectomy--data deficit, July 2014 with development of severe large incisional hernia.  7. Intermittent paroxysmal atrial fibrillation with oral anticoagulation and sotalol therapy and subsequently DC cardioversion, February 2005; March 2005, with subsequent sustained atrial fibrillation Tikosyn therapy followed by EP study and successful superior pulmonary vein isolation, as well as ablation of incessant right atrial tachycardia, May 2006 with subsequent apparent dual chamber pacemaker implant, July 2007 with subsequent EP study and AV node ablation with acceptable EP follow up, February 2009 with abnormal pacemaker interrogation with 29% AMS without reprogramming, August 2011, January 2012 with subsequent repeat XAVI and extensive attempt at ablation of atrial fibrillation with development of persistent atrial flutter, June 2012 with contemplated DC cardioversion, September 2012.   8. Watchman device implanted September 2015 by Dr. Zelaya.  a. Apparent recent pulse generator change; data deficit.  b. XAVI, November 2016; a 24 mm Watchman device visualized within left atrial appendage, device appears secure, small amount of fluid around one edge of the device measuring approximately 4 mm. The velocities within the device appeared to be 10-20 mmHg.  No thrombus or fibrinous strands appear to be in association with the device.  LVEF 0.60  c. Pacemaker interrogation, June 2017;  normal function, 100% mode switch, 95% RV paced; 9.5-10.5 years on battery, normal pacemaker check in  November 2018 with no nodes switches  9. Post procedure hematuria with hospitalization for hypokalemia, anemia, uncontrolled hematuria requiring cystoscopy and apparent cauterization of bladder tear -- data deficit ([facility]), May 2006 with progressive apparent lower tract obstructive symptoms and contemplated TURP - data deficit, June 2010.   10. Remote operations:  a. Upper extremity lipoma removal.  b. [place] teeth extraction  11. Morbid obesity, BMI 40.2  12. Apparent severe anemia with transfusion and subsequent eight courses of weekly IV iron therapy - data deficit, autumn 2012.   13. Contemplated DC cardioversion, September 2012, with device interrogation demonstrating persistent atrial fibrillation with VVI rate of 40 with 100% ventricular paced, September 2013 with subsequent redo pulmonary vein isolation procedure, June 2012 with subsequent internal cardioversion of atrial fibrillation to normal sinus rhythm, September 2012 with ERAF.  14. Remote apparent acceptable colonoscopy with abnormal EGD demonstrating Vallecillo’s esophagitis with apparent esophageal carcinoma with subsequent endoscopic EGD and scheduled PET scan with contemplated laparoscopic surgical resection as well as repair of possible adjunctive repair of large incisional ventral hernia with contemplated cardiothoracic surgical consultation at [facility] - data deficit, August-September 2014 with subsequent radiotherapy and chemotherapy with subsequent exploratory laparoscopic surgery with EGD without finding of residual cancer - data deficit, [facility], January 2015 with subsequent marked GI bleeding and apparent gastric ulcer - data deficit, [facility], February 2015.   15. [facility] ED visit 1/6/18 for CHF symptoms with chest  x-ray demonstrating cardiomegaly with pulmonary edema  16. Type 2 diabetes mellitus, hemoglobin A1c 6.9%, April 2019  17. Melanoma excision right face, summer 2019 - data deficit  18. Renal tumor with upcoming biopsy February 2020 at [facility]      No Known Allergies      Current Outpatient Medications:   •  amLODIPine-benazepril (LOTREL) 10-20 MG per capsule, 2 (Two) Times a Day., Disp: , Rfl:   •  aspirin 81 MG EC tablet, Take 325 mg by mouth Daily., Disp: , Rfl:   •  BYSTOLIC 10 MG tablet, TAKE 1 TABLET BY MOUTH DAILY, Disp: 90 tablet, Rfl: 3  •  Calcium Carbonate-Vitamin D3 (CALCIUM 600+D3) 600-400 MG-UNIT tablet, Take  by mouth 2 (Two) Times a Day., Disp: , Rfl:   •  cetirizine (zyrTEC) 10 MG tablet, Take 10 mg by mouth Daily., Disp: , Rfl:   •  chlorthalidone (HYGROTON) 25 MG tablet, Take 0.5 tablets by mouth Daily., Disp: 45 tablet, Rfl: 3  •  eplerenone (INSPRA) 25 MG tablet, TAKE 1 TABLET BY MOUTH DAILY, Disp: 90 tablet, Rfl: 3  •  esomeprazole (NexIUM) 40 MG capsule, Take 40 mg by mouth daily., Disp: , Rfl:   •  fenofibrate 160 MG tablet, 160 mg daily., Disp: , Rfl:   •  finasteride (PROSCAR) 5 MG tablet, Take 5 mg by mouth Daily., Disp: , Rfl: 3  •  Magnesium 400 MG capsule, Take 400 mg by mouth Daily., Disp: 90 capsule, Rfl: 1  •  metFORMIN (GLUCOPHAGE) 500 MG tablet, 500 mg 2 (Two) Times a Day With Meals., Disp: , Rfl:   •  nitroglycerin (NITROSTAT) 0.4 MG SL tablet, 1 under the tongue as needed for angina, may repeat q5mins for up three doses, Disp: 100 tablet, Rfl: 1  •  potassium chloride (K-TAB) 20 MEQ tablet controlled-release ER tablet, Take 2 tablets by mouth 3 (Three) Times a Day With Meals., Disp: 270 tablet, Rfl: 4  •  rosuvastatin (CRESTOR) 40 MG tablet, TAKE 1 TABLET BY MOUTH EVERY DAY, Disp: 90 tablet, Rfl: 3  •  sertraline (ZOLOFT) 50 MG tablet, 50 mg 2 (two) times a day., Disp: , Rfl:   •  tamsulosin (FLOMAX) 0.4 MG capsule 24 hr capsule, Take 1 capsule by mouth Every  "Night., Disp: , Rfl:   •  torsemide (DEMADEX) 20 MG tablet, TAKE 1 TABLET BY MOUTH DAILY, Disp: 90 tablet, Rfl: 1    HISTORY OF PRESENT ILLNESS: Patient returns for scheduled 6-month followup.  The patient states that he has some mild shortness of breath on exertion but attributes this to his weight gain.  He is trying to do a little bit of walking but admits he does not walk as much as he used to.  He denies any chest pain, palpitations, dizziness, presyncope, or syncope.  He has a renal tumor and is scheduled to have a biopsy next week at [facility].  His radiologist is Kings Hooks MD. His blood pressure a couple of days ago was 128 systolic, but the patient has a blood pressure monitor at home and will start monitoring it more frequently.  He has had no interim additional medical or surgical events, prolonged antibiotic therapy, ED visits, or hospitalizations.  Patient otherwise denies chest pain, severe shortness of breath, PND, edema, palpitations, syncope or presyncope at this time on limited activity.        Review of Systems   Constitution: Positive for malaise/fatigue.   Eyes: Positive for visual disturbance.   Cardiovascular: Positive for dyspnea on exertion.   Respiratory: Positive for shortness of breath and snoring.    Skin: Positive for dry skin and itching.   Musculoskeletal: Positive for back pain.   Gastrointestinal: Positive for constipation.   Genitourinary: Positive for frequency.      All other systems reviewed and otherwise negative.    Procedures       Objective:       Vitals:    02/05/20 1132 02/05/20 1137   BP: 159/83 143/78   BP Location: Right arm Right arm   Patient Position: Sitting Standing   Pulse: 70 70   Weight: (!) 142 kg (313 lb)    Height: 188 cm (74\")    Recheck blood pressure right arm sitting was 140/76  Body mass index is 40.19 kg/m².   Last weight:  295 lbs.    Physical Exam   Constitutional: He is oriented to person, place, and time. He appears well-developed " and well-nourished.   Neck: No JVD present. Carotid bruit is not present. No thyromegaly present.   Cardiovascular: Regular rhythm, S1 normal and S2 normal. Exam reveals no gallop, no S3 and no friction rub.   Murmur heard.   Medium-pitched early systolic murmur is present with a grade of 1/6 at the lower left sternal border.  Pulses:       Carotid pulses are 1+ on the right side, and 1+ on the left side.       Radial pulses are 1+ on the right side, and 1+ on the left side.        Femoral pulses are 1+ on the right side, and 1+ on the left side.       Popliteal pulses are 1+ on the right side, and 1+ on the left side.        Dorsalis pedis pulses are 1+ on the right side, and 1+ on the left side.        Posterior tibial pulses are 1+ on the right side, and 1+ on the left side.   Pulmonary/Chest: Effort normal. He has decreased breath sounds. He has no wheezes. He has no rhonchi. He has no rales.   Abdominal: Soft. He exhibits no mass. There is no hepatosplenomegaly. There is no tenderness. There is no guarding.   Bowel sounds audible x4  Protuberant soft abdomen with large ventral hernia   Musculoskeletal: Normal range of motion. He exhibits edema.   Lymphadenopathy:     He has no cervical adenopathy.   Neurological: He is alert and oriented to person, place, and time.   Skin: Skin is warm, dry and intact. No rash noted.   Vitals reviewed.        Lab Review: No recent laboratory results available for review today    Lab Results   Component Value Date    GLUCOSE 97 09/28/2015    BUN 14 01/17/2018    CREATININE 0.90 01/17/2018    EGFRIFNONA 83 01/17/2018    EGFRIFAFRI 101 01/17/2018    BCR 15.6 01/17/2018    CO2 30.0 01/17/2018    CALCIUM 9.2 01/17/2018       Lab Results   Component Value Date    WBC 8.16 09/28/2015    HGB 13.4 09/28/2015    HCT 41.5 09/28/2015    MCV 84.2 09/28/2015     09/28/2015       Lab Results   Component Value Date    HGBA1C 6.0 09/28/2015           Assessment:   Overall continued  acceptable course with no new interim cardiopulmonary complaints with acceptable functional status on limited activity. We will defer additional diagnostic or therapeutic intervention from a cardiac perspective at this time.  The patient's blood pressure is elevated ,so we will increase his chlorthalidone to 25 mg daily.  We encouraged the patient to monitor his blood pressure at home, and if it is consistently at or above 140 systolic, he is to call us.  Hopefully, we will be allowed to review his laboratory results soon, and we provided the patient with our fax number.  He has a renal biopsy scheduled for next week.  He may be better served by initiation of Vascepa to replace fenofibrate, but we have no laboratory studies to review.       Diagnosis Plan   1. Coronary artery disease involving native coronary artery of native heart without angina pectoris   No recurrent angina, continue torsemide, rosuvastatin, nitroglycerin sublingual, chlorthalidone, eplerenone, Bystolic, aspirin, Lotrel   2. Essential hypertension   Elevated, continue torsemide, eplerenone, Bystolic, Lotrel, increase chlorthalidone to 25 mg daily, encouraged the patient to monitor his blood pressure at home and to alert us if it is consistently staying at 140 systolic or greater   3. Chronic atrial fibrillation   Stable, continue aspirin, patient has Watchman device   4. Dyslipidemia   No new labs to review, continue rosuvastatin          Plan:         1. Patient to continue current medications and close follow up with the above providers other than to alter chlorthalidone to 25 mg daily.  2. Tentative cardiology follow up in August 2020, or patient may return sooner PRN.  3. Encouraged the patient to monitor his blood pressure at home and to let us know if it is consistently at or above 140 systolic.    Scribed for Cornelio Jimenez MD by Luisana Moore, [facility]. 2/5/2020  12:17 PM    Cornelio OLSEN MD, [facility], personally performed the services  described in this documentation as scribed by the above named individual in my presence, and it is both accurate and complete. At 12:48 PM on 02/05/2020

## 2020-04-10 ENCOUNTER — CLINICAL SUPPORT NO REQUIREMENTS (OUTPATIENT)
Dept: CARDIOLOGY | Facility: CLINIC | Age: 75
End: 2020-04-10

## 2020-04-10 DIAGNOSIS — I48.20 CHRONIC ATRIAL FIBRILLATION (HCC): ICD-10-CM

## 2020-04-10 PROCEDURE — 93296 REM INTERROG EVL PM/IDS: CPT | Performed by: INTERNAL MEDICINE

## 2020-04-10 PROCEDURE — 93294 REM INTERROG EVL PM/LDLS PM: CPT | Performed by: INTERNAL MEDICINE

## 2020-06-01 RX ORDER — NEBIVOLOL HYDROCHLORIDE 10 MG/1
TABLET ORAL
Qty: 90 TABLET | Refills: 3 | Status: SHIPPED | OUTPATIENT
Start: 2020-06-01 | End: 2021-06-02

## 2020-07-14 DIAGNOSIS — I25.10 CORONARY ARTERY DISEASE INVOLVING NATIVE CORONARY ARTERY OF NATIVE HEART WITHOUT ANGINA PECTORIS: ICD-10-CM

## 2020-07-14 RX ORDER — TORSEMIDE 20 MG/1
TABLET ORAL
Qty: 90 TABLET | Refills: 1 | Status: SHIPPED | OUTPATIENT
Start: 2020-07-14 | End: 2021-01-12

## 2020-08-05 NOTE — PROGRESS NOTES
Subjective:     Encounter Date:08/07/2020    Patient ID: Markus Allen is a 74 y.o.  white male, [facility] retired /retired [place] , from [place], [place].      REFERRING PHYSICIAN/INTERNIST: Maxi Walker MD  [place] CARDIOLOGIST: Sami Bernal MD, [facility]  INTERVENTIONAL CARDIOLOGIST: Dhaval Joaquin MD, [facility], [facility]  ELECTROPHYSIOLOGIST: Nando Zelaya MD, [facility], [facility]  UROLOGIST: Bennett Garcia III, MD  HEMATOLOGIST: Dheeraj Knott MD   FORMER PHYSICIAN:  Edita Hall MD  CURRENT PHYSICIAN: Nila Rebollar DO  RADIOLOGIST: Kings Hooks MD    Chief Complaint:   Chief Complaint   Patient presents with   • Coronary artery disease involving native coronary artery of        Problem List:  1. Ischemic heart disease:  a. Remote intermittent recurrent CCS class III-IV angina pectoris with abnormal positive intravenous adenosine SPECT/thallium-201 study/abnormal EKG with 3-vessel coronary atherosclerosis with severe 2-vessel branch involvement/acceptable left ventricular function, LVEF (0.70), December 1997.  b. Acceptable nondiagnostic borderline GXT, LVEF (0.72), July 2003.  c. Paroxysmal atrial fibrillation with abnormal myocardial perfusion study and subsequent catheterization studies demonstrating diffuse 3-vessel coronary atherosclerosis with 90% stenosis at origin of the mid portion of second obtuse marginal branch and nondominant left circumflex coronary artery treated with angioplasty and stent deployment using drug eluting stent with acceptable left ventricular function, LVEF (0.62), January 2005.  d. Abnormal IV [place] Quantitative SPECT gated Cardiolite study, LVEF (0.65), April 2010.  e. Residual CCS class I angina pectoris/NYHA class II to III exertional dyspnea and fatigue syndrome with recent [facility] ED evaluation for probable congestive heart failure, January 2018.  f. Echocardiogram 6/20/18: LA severely dilated, mild to moderate MR,  mild-to-moderate TR, the following LV wall segments are hypokinetic: Apical septal, mid inferoseptal, apex hypokinetic and mid anteroseptal.  LVEF 0.58, RV mildly dilated, no pericardial effusion, mild pulmonary hypertension, mild MAC present   g. CCS  class I chest discomfort/NYHA class I-II URIBE symptoms.  2. Severe hypertension/probably essential with remote normal selective bilateral renal arteriography, December 1997.  3. Dyslipidemia.  4. Remote tobacco abuse, resolved.  5. Abnormal chest x-ray.  6. Remote abnormal colonoscopy with findings of malignant polyp with subsequent sigmoid partial colectomy--data deficit, July 2014 with development of severe large incisional hernia.  7. Intermittent paroxysmal atrial fibrillation with oral anticoagulation and sotalol therapy and subsequently DC cardioversion, February 2005; March 2005, with subsequent sustained atrial fibrillation Tikosyn therapy followed by EP study and successful superior pulmonary vein isolation, as well as ablation of incessant right atrial tachycardia, May 2006 with subsequent apparent dual chamber pacemaker implant, July 2007 with subsequent EP study and AV node ablation with acceptable EP follow up, February 2009 with abnormal pacemaker interrogation with 29% AMS without reprogramming, August 2011, January 2012 with subsequent repeat XAVI and extensive attempt at ablation of atrial fibrillation with development of persistent atrial flutter, June 2012 with contemplated DC cardioversion, September 2012.   8. Watchman device implanted September 2015 by Dr. Zelaya.  a. Apparent recent pulse generator change; data deficit.  b. XAVI, November 2016; a 24 mm Watchman device visualized within left atrial appendage, device appears secure, small amount of fluid around one edge of the device measuring approximately 4 mm. The velocities within the device appeared to be 10-20 mmHg.  No thrombus or fibrinous strands appear to be in association with the  device.  LVEF 0.60  c. Pacemaker interrogation, June 2017; normal function, 100% mode switch, 95% RV paced; 9.5-10.5 years on battery, normal pacemaker check in  November 2018 with no nodes switches  d. Acceptable pacemaker interrogation, August 2020  9. Post procedure hematuria with hospitalization for hypokalemia, anemia, uncontrolled hematuria requiring cystoscopy and apparent cauterization of bladder tear -- data deficit ([facility]), May 2006 with progressive apparent lower tract obstructive symptoms and contemplated TURP - data deficit, June 2010.   10. Remote operations:  a. Upper extremity lipoma removal.  b. [place] teeth extraction  11. Morbid obesity, BMI 48.8  12. Apparent severe anemia with transfusion and subsequent eight courses of weekly IV iron therapy - data deficit, autumn 2012.   13. Contemplated DC cardioversion, September 2012, with device interrogation demonstrating persistent atrial fibrillation with VVI rate of 40 with 100% ventricular paced, September 2013 with subsequent redo pulmonary vein isolation procedure, June 2012 with subsequent internal cardioversion of atrial fibrillation to normal sinus rhythm, September 2012 with ERAF.  14. Remote apparent acceptable colonoscopy with abnormal EGD demonstrating Vallecillo’s esophagitis with apparent esophageal carcinoma with subsequent endoscopic EGD and scheduled PET scan with contemplated laparoscopic surgical resection as well as repair of possible adjunctive repair of large incisional ventral hernia with contemplated cardiothoracic surgical consultation at [facility] - data deficit, August-September 2014 with subsequent radiotherapy and chemotherapy with subsequent exploratory laparoscopic surgery with EGD without finding of residual cancer - data deficit, [facility], January 2015 with subsequent marked GI bleeding and apparent gastric ulcer - data deficit, [facility], February 2015.   15. [facility] ED visit 1/6/18 for CHF symptoms with chest x-ray demonstrating cardiomegaly with pulmonary edema  16. Type 2 diabetes mellitus, hemoglobin A1c 6.9%, April 2019  17. Melanoma excision right face, summer 2019 - data deficit  18. Renal tumor with biopsy, February 2020 at [facility] with subsequent apparent percutaneous ablation- data deficit, Spring 2020    No Known Allergies    Current Outpatient Medications:   •  amLODIPine-benazepril (LOTREL) 10-20 MG per capsule, 1 capsule 2 (Two) Times a Day., Disp: , Rfl:   •  aspirin  MG tablet, Take 325 mg by mouth Daily., Disp: , Rfl:   •  BYSTOLIC 10 MG tablet, TAKE 1 TABLET BY MOUTH DAILY, Disp: 90 tablet, Rfl: 3  •  Calcium Carbonate-Vitamin D3 (CALCIUM 600+D3) 600-400 MG-UNIT tablet, Take 1 tablet by mouth 2 (Two) Times a Day., Disp: , Rfl:   •  cetirizine (zyrTEC) 10 MG tablet, Take 10 mg by mouth Daily., Disp: , Rfl:   •  chlorthalidone (HYGROTON) 25 MG tablet, Take 1 tablet by mouth Daily. (Patient taking differently: Take 12.5 mg by mouth Daily.), Disp: 90 tablet, Rfl: 1  •  eplerenone (INSPRA) 25 MG tablet, TAKE 1 TABLET BY MOUTH DAILY, Disp: 90 tablet, Rfl: 3  •  esomeprazole (NexIUM) 40 MG capsule, Take 40 mg by mouth daily., Disp: , Rfl:   •  fenofibrate 160 MG tablet, Take 160 mg by mouth Daily., Disp: , Rfl:   •  finasteride (PROSCAR) 5 MG tablet, Take 5 mg by mouth Daily., Disp: , Rfl: 3  •  Magnesium 400 MG capsule, Take 400 mg by mouth Daily., Disp: 90 capsule, Rfl: 1  •  metFORMIN (GLUCOPHAGE) 500 MG tablet, Take 500 mg by mouth 2 (Two) Times a Day With Meals., Disp: , Rfl:   •  nitroglycerin (NITROSTAT) 0.4 MG SL tablet, 1 under the tongue as needed for angina, may repeat q5mins for up three doses, Disp: 100 tablet, Rfl: 1  •  potassium chloride (K-TAB) 20 MEQ tablet controlled-release ER tablet, Take 2 tablets by mouth 3 (Three) Times a Day With Meals. (Patient taking  differently: Take 40 mEq by mouth 2 (Two) Times a Day.), Disp: 270 tablet, Rfl: 4  •  prenatal vitamin (prenatal, CLASSIC, vitamin) tablet, Take 1 tablet by mouth 2 (two) times a day., Disp: , Rfl:   •  rOPINIRole (REQUIP) 5 MG tablet, Take 5 mg by mouth Every Night., Disp: , Rfl:   •  rosuvastatin (CRESTOR) 40 MG tablet, TAKE 1 TABLET BY MOUTH EVERY DAY, Disp: 90 tablet, Rfl: 3  •  sertraline (ZOLOFT) 50 MG tablet, Take 50 mg by mouth 2 (Two) Times a Day., Disp: , Rfl:   •  tamsulosin (FLOMAX) 0.4 MG capsule 24 hr capsule, Take 1 capsule by mouth Every Night., Disp: , Rfl:   •  torsemide (DEMADEX) 20 MG tablet, TAKE 1 TABLET BY MOUTH EVERY DAY, Disp: 90 tablet, Rfl: 1    History of Present Illness: Patient returns for scheduled 6-month follow up. Since last visit, patient has been doing well overall. He had a renal tumor biopsy on 02/10/2020 at [facility] with subsequent apparent percutaneous ablation- data deficit. He also developed restless leg and was started on Requip with complete relief. He has been active without chest pain, pressure or shortness of breath besides when he has to wear a mask. He has been trying to restirct his caloric intake and has lost 11 lbs since last visit, February 2020. He also notes he started to take a prenatal vitamin after chemotherapy and says this has been helping him. Patient has had no interim ER visits, hospitalizations, serious illnesses, or surgeries. Patient otherwise denies chest pain, shortness of breath, PND, edema, palpitations, syncope, or presyncope at this time. It has been 15-months since his last device interrogation and he will see Dr. Zelaya in [place] office.           ROS   Obtained and negative except as outlined in problem list and HPI.    Procedures     Pacemaker interrogation:  [employer] Model 2240 VVIR at 70 BPM,100% RV paced, no underlying rhythm (pacemaker dependent), nominal lead threshold and impendence, battery voltage 2.99 volts, no  "events, home monitor in place and actively transmitting, no changes     Objective:       Vitals:    08/07/20 1024 08/07/20 1028   BP: 114/76 130/70   BP Location: Right arm Right arm   Patient Position: Sitting Standing   Pulse: 70 73   Temp: 97.3 °F (36.3 °C)    SpO2: 96%    Weight: (!) 137 kg (302 lb)    Height: 188 cm (74\")      Body mass index is 38.77 kg/m².  Last weight: 313 lbs    Physical Exam   Constitutional: He is oriented to person, place, and time. He appears well-developed and well-nourished.   Neck: No JVD present. Carotid bruit is not present. No thyromegaly present.   Cardiovascular: Regular rhythm, S1 normal and S2 normal. Exam reveals no gallop, no S3 and no friction rub.   Murmur heard.   Medium-pitched early systolic murmur is present with a grade of 2/6 at the lower left sternal border.   Medium-pitched early systolic murmur of grade 2/6 is also present at the upper right sternal border.  Pulses:       Carotid pulses are 1+ on the right side, and 1+ on the left side.       Radial pulses are 1+ on the right side, and 1+ on the left side.        Femoral pulses are 1+ on the right side, and 1+ on the left side.       Popliteal pulses are 1+ on the right side, and 1+ on the left side.        Dorsalis pedis pulses are 1+ on the right side, and 1+ on the left side.        Posterior tibial pulses are 1+ on the right side, and 1+ on the left side.   Pulmonary/Chest: Effort normal. He has decreased breath sounds. He has no wheezes. He has no rhonchi. He has no rales.   Left precordial pacemaker site is nominal   Abdominal: Soft. He exhibits no mass. There is no hepatosplenomegaly. There is no tenderness. There is no guarding.   Musculoskeletal: Normal range of motion. He exhibits no edema.   Lymphadenopathy:     He has no cervical adenopathy.   Neurological: He is alert and oriented to person, place, and time.   Skin: Skin is warm, dry and intact. No rash noted.   Vitals reviewed.        Lab Review: "     05/14/2020 (reviewed with patient by letter- recommended adding Jardiance 10 mg daily):  • CMP: normal electrolytes with excellent kidney function, serum creatinine 1.1, BUN 19, glucose 116 and normal serum calcium and liver function tests.   • Serum potassium- 3.5  • FLP: total cholesterol 111, triglycerides 103, HDL-C 38, LDL-C 52  • Hemoglobin A1c- 6.8%      Lab Results   Component Value Date    GLUCOSE 97 09/28/2015    BUN 14 01/17/2018    CREATININE 0.90 01/17/2018    EGFRIFNONA 83 01/17/2018    EGFRIFAFRI 101 01/17/2018    BCR 15.6 01/17/2018     01/17/2018    K 3.8 01/17/2018     01/17/2018    MG 1.9 01/17/2018    CO2 30.0 01/17/2018    CALCIUM 9.2 01/17/2018       Lab Results   Component Value Date    WBC 9.1 03/20/2020    HGB 9.8 (L) 03/20/2020    HCT 31.2 (L) 03/20/2020    MCV 73.3 (L) 03/20/2020     03/20/2020       Lab Results   Component Value Date    HGBA1C 6.0 09/28/2015               Assessment:       Overall continued acceptable course with no new interim cardiopulmonary complaints with good functional status. We will defer additional diagnostic or therapeutic intervention from a cardiac perspective at this time.     Diagnosis Plan   1. Ischemic heart disease  No recurrent angina pectoris or CHF on current activity schedule; continue current treatment    2. Persistent atrial fibrillation (CMS/HCC)  Acceptable pacemaker interrogation; Continue current treatment.   3. Moderate obesity  Encouraged by current weight loss and diet; would continue    4. Dyslipidemia  Controlled; continue rosuvastatin          Plan:         1. Patient to continue current medications and close follow up with the above providers.  2. Tentative cardiology follow up in March 2021 and see EP in January 2021 or patient may return sooner PRN.    Scribed for Cornelio Jimenez MD by Sujata Johnson. 8/7/2020  11:01    I, Cornelio Jimenez MD, [facility], personally performed the services described in this  documentation as scribed by the above named individual in my presence, and it is both accurate and complete. At 10:59 AM on 08/07/2020

## 2020-08-07 ENCOUNTER — OFFICE VISIT (OUTPATIENT)
Dept: CARDIOLOGY | Facility: CLINIC | Age: 75
End: 2020-08-07

## 2020-08-07 VITALS
SYSTOLIC BLOOD PRESSURE: 130 MMHG | WEIGHT: 302 LBS | HEART RATE: 73 BPM | OXYGEN SATURATION: 96 % | BODY MASS INDEX: 38.76 KG/M2 | DIASTOLIC BLOOD PRESSURE: 70 MMHG | TEMPERATURE: 97.3 F | HEIGHT: 74 IN

## 2020-08-07 DIAGNOSIS — I48.19 PERSISTENT ATRIAL FIBRILLATION (HCC): ICD-10-CM

## 2020-08-07 DIAGNOSIS — I25.9 ISCHEMIC HEART DISEASE: Primary | ICD-10-CM

## 2020-08-07 DIAGNOSIS — E78.5 DYSLIPIDEMIA: ICD-10-CM

## 2020-08-07 DIAGNOSIS — E66.8 MODERATE OBESITY: ICD-10-CM

## 2020-08-07 PROCEDURE — 99214 OFFICE O/P EST MOD 30 MIN: CPT | Performed by: INTERNAL MEDICINE

## 2020-08-07 PROCEDURE — 93288 INTERROG EVL PM/LDLS PM IP: CPT | Performed by: INTERNAL MEDICINE

## 2020-08-07 RX ORDER — PRENATAL VIT/IRON FUM/FOLIC AC 27MG-0.8MG
1 TABLET ORAL 2 TIMES DAILY
COMMUNITY

## 2020-08-07 RX ORDER — ROPINIROLE 5 MG/1
5 TABLET, FILM COATED ORAL NIGHTLY
COMMUNITY

## 2020-10-14 ENCOUNTER — TELEPHONE (OUTPATIENT)
Dept: CARDIOLOGY | Facility: CLINIC | Age: 75
End: 2020-10-14

## 2020-10-16 DIAGNOSIS — I25.10 CORONARY ARTERY DISEASE INVOLVING NATIVE CORONARY ARTERY OF NATIVE HEART WITHOUT ANGINA PECTORIS: ICD-10-CM

## 2020-10-16 DIAGNOSIS — I10 ESSENTIAL HYPERTENSION: ICD-10-CM

## 2020-10-16 RX ORDER — EPLERENONE 25 MG/1
25 TABLET, FILM COATED ORAL DAILY
Qty: 90 TABLET | Refills: 3 | Status: SHIPPED | OUTPATIENT
Start: 2020-10-16 | End: 2021-10-18

## 2021-01-12 DIAGNOSIS — I25.10 CORONARY ARTERY DISEASE INVOLVING NATIVE CORONARY ARTERY OF NATIVE HEART WITHOUT ANGINA PECTORIS: ICD-10-CM

## 2021-01-12 RX ORDER — TORSEMIDE 20 MG/1
TABLET ORAL
Qty: 90 TABLET | Refills: 1 | Status: SHIPPED | OUTPATIENT
Start: 2021-01-12 | End: 2021-07-16

## 2021-01-21 ENCOUNTER — OFFICE VISIT (OUTPATIENT)
Dept: CARDIOLOGY | Facility: CLINIC | Age: 76
End: 2021-01-21

## 2021-01-21 VITALS
HEART RATE: 70 BPM | OXYGEN SATURATION: 95 % | BODY MASS INDEX: 37.86 KG/M2 | DIASTOLIC BLOOD PRESSURE: 64 MMHG | SYSTOLIC BLOOD PRESSURE: 132 MMHG | HEIGHT: 74 IN | WEIGHT: 295 LBS

## 2021-01-21 DIAGNOSIS — I48.19 PERSISTENT ATRIAL FIBRILLATION (HCC): Primary | ICD-10-CM

## 2021-01-21 DIAGNOSIS — I10 ESSENTIAL HYPERTENSION: ICD-10-CM

## 2021-01-21 DIAGNOSIS — I48.20 CHRONIC ATRIAL FIBRILLATION (HCC): ICD-10-CM

## 2021-01-21 PROCEDURE — 99213 OFFICE O/P EST LOW 20 MIN: CPT | Performed by: INTERNAL MEDICINE

## 2021-01-21 PROCEDURE — 93279 PRGRMG DEV EVAL PM/LDLS PM: CPT | Performed by: INTERNAL MEDICINE

## 2021-01-21 NOTE — PROGRESS NOTES
Markus Allen  1945  PCP: Nila Rebollar DO    SUBJECTIVE:   Markus Allen is a 75 y.o. male seen for a follow up visit regarding the following:     Chief Complaint: Follow up for afib, pacemaker check, HTN    HPI:    Patient presents today for follow-up of his permanent atrial fibrillation s/p AVN ablation and VVIR pacemaker. He has been doing well from a cardiac standpoint. No tachycardia, palpitaitons, CVA/TIA symptoms. He denies any chest pain, sob, or worsening edema.     Problem List:  1. Ischemic heart disease:  a. Remote intermittent recurrent CCS class III-IV angina pectoris with abnormal positive intravenous adenosine SPECT/thallium-201 study/abnormal EKG with 3-vessel coronary atherosclerosis with severe 2-vessel branch involvement/acceptable left ventricular function, LVEF (0.70), December 1997.  b. Acceptable nondiagnostic borderline GXT, LVEF (0.72), July 2003.  c. Paroxysmal atrial fibrillation with abnormal myocardial perfusion study and subsequent catheterization studies demonstrating diffuse 3-vessel coronary atherosclerosis with 90% stenosis at origin of the mid portion of second obtuse marginal branch and nondominant left circumflex coronary artery treated with angioplasty and stent deployment using drug eluting stent with acceptable left ventricular function, LVEF (0.62), January 2005.  d. Abnormal IV [place]enowalk Quantitative SPECT gated Cardiolite study, LVEF (0.65), April 2010.  e. Residual CCS class I angina pectoris/NYHA class II to III exertional dyspnea and fatigue syndrome with recent [facility] ED evaluation for probable congestive heart failure, January 2018.  f. Echocardiogram 6/20/18: LA severely dilated, mild to moderate MR, mild-to-moderate TR, the following LV wall segments are hypokinetic: Apical septal, mid inferoseptal, apex hypokinetic and mid anteroseptal.  LVEF 0.58, RV mildly dilated, no pericardial effusion, mild pulmonary hypertension, mild MAC present    g. CCS  class I chest discomfort/NYHA class I-II URIBE symptoms.  2. Severe hypertension/probably essential with remote normal selective bilateral renal arteriography, December 1997.  3. Dyslipidemia.  4. Remote tobacco abuse, resolved.  5. Abnormal chest x-ray.  6. Remote abnormal colonoscopy with findings of malignant polyp with subsequent sigmoid partial colectomy--data deficit, July 2014 with development of severe large incisional hernia.  7. Intermittent paroxysmal atrial fibrillation with oral anticoagulation and sotalol therapy and subsequently DC cardioversion, February 2005; March 2005, with subsequent sustained atrial fibrillation Tikosyn therapy followed by EP study and successful superior pulmonary vein isolation, as well as ablation of incessant right atrial tachycardia, May 2006 with subsequent apparent dual chamber pacemaker implant, July 2007 with subsequent EP study and AV node ablation with acceptable EP follow up, February 2009 with abnormal pacemaker interrogation with 29% AMS without reprogramming, August 2011, January 2012 with subsequent repeat XAVI and extensive attempt at ablation of atrial fibrillation with development of persistent atrial flutter, June 2012 with contemplated DC cardioversion, September 2012.   8. Watchman device implanted September 2015 by Dr. Zelaya.  a. Apparent recent pulse generator change; data deficit.  b. XAVI, November 2016; a 24 mm Watchman device visualized within left atrial appendage, device appears secure, small amount of fluid around one edge of the device measuring approximately 4 mm. The velocities within the device appeared to be 10-20 mmHg.  No thrombus or fibrinous strands appear to be in association with the device.  LVEF 0.60  c. Pacemaker interrogation, June 2017; normal function, 100% mode switch, 95% RV paced; 9.5-10.5 years on battery, normal pacemaker check in  November 2018 with no nodes switches  d. Acceptable pacemaker interrogation, August  2020  9. Post procedure hematuria with hospitalization for hypokalemia, anemia, uncontrolled hematuria requiring cystoscopy and apparent cauterization of bladder tear -- data deficit ([facility]), May 2006 with progressive apparent lower tract obstructive symptoms and contemplated TURP - data deficit, June 2010.   10. Remote operations:  a. Upper extremity lipoma removal.  b. [place] teeth extraction  11. Morbid obesity, BMI 48.8  12. Apparent severe anemia with transfusion and subsequent eight courses of weekly IV iron therapy - data deficit, autumn 2012.   13. Contemplated DC cardioversion, September 2012, with device interrogation demonstrating persistent atrial fibrillation with VVI rate of 40 with 100% ventricular paced, September 2013 with subsequent redo pulmonary vein isolation procedure, June 2012 with subsequent internal cardioversion of atrial fibrillation to normal sinus rhythm, September 2012 with ERAF.  14. Remote apparent acceptable colonoscopy with abnormal EGD demonstrating Vallecillo’s esophagitis with apparent esophageal carcinoma with subsequent endoscopic EGD and scheduled PET scan with contemplated laparoscopic surgical resection as well as repair of possible adjunctive repair of large incisional ventral hernia with contemplated cardiothoracic surgical consultation at [facility] - data deficit, August-September 2014 with subsequent radiotherapy and chemotherapy with subsequent exploratory laparoscopic surgery with EGD without finding of residual cancer - data deficit, [facility], January 2015 with subsequent marked GI bleeding and apparent gastric ulcer - data deficit, [facility], February 2015.   15. [facility] ED visit 1/6/18 for CHF symptoms with chest x-ray demonstrating cardiomegaly with pulmonary edema  16. Type 2 diabetes mellitus, hemoglobin A1c 6.9%, April 2019  17. Melanoma excision  right face, summer 2019 - data deficit  18. Renal tumor with biopsy, February 2020 at [facility] with subsequent apparent percutaneous ablation- data deficit, Spring 2020      Current Outpatient Medications:   •  amLODIPine-benazepril (LOTREL) 10-20 MG per capsule, 1 capsule 2 (Two) Times a Day., Disp: , Rfl:   •  aspirin  MG tablet, Take 325 mg by mouth Daily., Disp: , Rfl:   •  BYSTOLIC 10 MG tablet, TAKE 1 TABLET BY MOUTH DAILY, Disp: 90 tablet, Rfl: 3  •  Calcium Carbonate-Vitamin D3 (CALCIUM 600+D3) 600-400 MG-UNIT tablet, Take 1 tablet by mouth 2 (Two) Times a Day., Disp: , Rfl:   •  cetirizine (zyrTEC) 10 MG tablet, Take 10 mg by mouth Daily., Disp: , Rfl:   •  chlorthalidone (HYGROTON) 25 MG tablet, Take 1 tablet by mouth Daily. (Patient taking differently: Take 12.5 mg by mouth Daily.), Disp: 90 tablet, Rfl: 1  •  eplerenone (INSPRA) 25 MG tablet, TAKE 1 TABLET BY MOUTH DAILY, Disp: 90 tablet, Rfl: 3  •  esomeprazole (NexIUM) 40 MG capsule, Take 40 mg by mouth daily., Disp: , Rfl:   •  fenofibrate 160 MG tablet, Take 160 mg by mouth Daily., Disp: , Rfl:   •  finasteride (PROSCAR) 5 MG tablet, Take 5 mg by mouth Daily., Disp: , Rfl: 3  •  Magnesium 400 MG capsule, Take 400 mg by mouth Daily., Disp: 90 capsule, Rfl: 1  •  metFORMIN (GLUCOPHAGE) 500 MG tablet, Take 500 mg by mouth 2 (Two) Times a Day With Meals., Disp: , Rfl:   •  nitroglycerin (NITROSTAT) 0.4 MG SL tablet, 1 under the tongue as needed for angina, may repeat q5mins for up three doses, Disp: 100 tablet, Rfl: 1  •  potassium chloride (K-TAB) 20 MEQ tablet controlled-release ER tablet, Take 2 tablets by mouth 3 (Three) Times a Day With Meals. (Patient taking differently: Take 40 mEq by mouth 2 (Two) Times a Day.), Disp: 270 tablet, Rfl: 4  •  prenatal vitamin (prenatal, CLASSIC, vitamin) tablet, Take 1 tablet by mouth 2 (two) times a day., Disp: , Rfl:   •  rOPINIRole (REQUIP) 5 MG tablet, Take 5 mg by mouth Every Night.,  Disp: , Rfl:   •  rosuvastatin (CRESTOR) 40 MG tablet, TAKE 1 TABLET BY MOUTH EVERY DAY, Disp: 90 tablet, Rfl: 3  •  sertraline (ZOLOFT) 50 MG tablet, Take 50 mg by mouth 2 (Two) Times a Day., Disp: , Rfl:   •  tamsulosin (FLOMAX) 0.4 MG capsule 24 hr capsule, Take 1 capsule by mouth Every Night., Disp: , Rfl:   •  torsemide (DEMADEX) 20 MG tablet, TAKE 1 TABLET BY MOUTH EVERY DAY, Disp: 90 tablet, Rfl: 1    Past Medical History, Past Surgical History, Family history, Social History, and Medications were all reviewed with the patient today and updated as necessary.       Patient Active Problem List   Diagnosis   • Angina pectoris (CMS/HCC)   • Coronary artery disease involving native coronary artery of native heart without angina pectoris   • Essential hypertension   • Obesity (BMI 35.0-39.9 without comorbidity)   • Chronic atrial fibrillation (CMS/HCC)   • Ischemic heart disease   • Hypertension   • Dyslipidemia   • Persistent atrial fibrillation (CMS/HCC)   • Hematuria   • Hypokalemia   • Anemia   • Moderate obesity     No Known Allergies  Past Medical History:   Diagnosis Date   • Abdominal hernia     severe large incisional   • AF (atrial fibrillation) (CMS/HCC)    • Anemia    • Vallecillo's esophagus with esophagitis     with subsequent esophageal cancer status post chemotherapy and radiation therapy   • Benign tumor of kidney    • Cancer (CMS/HCC)     Melnoma    • Coronary artery disease    • Dyslipidemia    • Dyslipidemia    • Gastric ulcer    • GI bleed     extensive   • Hematuria    • Hematuria    • Hyperlipidemia    • Hypertension 12/1997    Severe hypertension/probably essential with remote normal selective bilateral renal arteriography, December 1997.   • Hypokalemia    • Ischemic heart disease    • Kidney tumor    • Melanoma (CMS/HCC)     right side of the face    • Moderate obesity     Moderate obesity, BMI 37.5.   • Obesity    • Paroxysmal atrial fibrillation (CMS/HCC)    • Tobacco abuse     Remote  tobacco abuse, resolved.     Past Surgical History:   Procedure Laterality Date   • BLADDER REPAIR  2006    Post procedure hematuria with hospitalization for hypokalemia, anemia, uncontrolled hematuria requiring cystoscopy and apparent cauterization of bladder tear - data deficit ([facility]), May 2006 with recent progressive apparent lower tract obstructive symptoms and contemplated TURP - data deficit, 2010.     • CARDIAC ASSIST DEVICE INSERTION  2015    Watchman device implanted 2015 by Dr. Zelaya.   • COLECTOMY PARTIAL / TOTAL  2014    Remote abnormal colonoscopy with findings of malignant polyp with subsequent sigmoid partial colectomy--data deficit, 2014 with development of severe large incisional hernia.   • CORONARY ANGIOPLASTY WITH STENT PLACEMENT Left 2005    Paroxysmal atrial fibrillation with abnormal myocardial perfusion study and subsequent catheterization studies demonstrating diffuse 3-vessel coronary atherosclerosis with 90% stenosis at origin of the mid portion of second obtuse marginal branch and nondominant left circumflex coronary artery treated with angioplasty and stent deployment using drug eluting stent with acceptable left ventricular f   • INSERT / REPLACE / REMOVE PACEMAKER     • KIDNEY SURGERY      tumor removed   • LIPOMA EXCISION      upper extremity   • OTHER SURGICAL HISTORY      pulmonary vein ablation X2   • PACEMAKER IMPLANTATION  2007    apparent dual chamber pacemaker implant, 2007   • SKIN CANCER EXCISION      right side of face removed    • WISDOM TOOTH EXTRACTION       Family History   Problem Relation Age of Onset   • Heart attack Mother      Social History     Tobacco Use   • Smoking status: Former Smoker     Packs/day: 1.00     Types: Cigarettes     Quit date: 1991     Years since quittin.1   • Smokeless tobacco: Never Used   Substance Use Topics   • Alcohol use: No         PHYSICAL EXAM:    /64 (BP  "Location: Left arm, Patient Position: Sitting)   Pulse 70   Ht 188 cm (74\")   Wt 134 kg (295 lb)   SpO2 95%   BMI 37.88 kg/m²        Wt Readings from Last 5 Encounters:   01/21/21 134 kg (295 lb)   08/07/20 (!) 137 kg (302 lb)   02/05/20 (!) 142 kg (313 lb)   07/24/19 134 kg (295 lb)   04/18/19 134 kg (295 lb)       BP Readings from Last 5 Encounters:   01/21/21 132/64   08/07/20 130/70   02/05/20 143/78   07/24/19 135/76   04/18/19 128/72       General-Well Nourished, Well developed  Eyes - PERRLA  Neck- supple, No mass  CV- regular rate and rhythm, no MRG, No edema  Lung- clear bilaterally  Abd- soft, +BS  Musc/skel - Norm strength and range of motion  Skin- warm and dry  Neuro - Alert & Oriented x 3, appropriate mood.        Medical problems and test results were reviewed with the patient today.     No results found for this or any previous visit (from the past 672 hour(s)).      EKG: (EKG has been independently visualized by me and summarized below)    Procedures     SJM single chamber pacemaker: VVIR 70; RV paced >99%. Threshold 0.75 @0.5ms; impedence 430. Battery 10-11 years. No events.     ASSESSMENT and PLAN    1) Permanent atrial fibrillation: s/p AVN and VVIR Pacemaker. Stable function on device check today. Heart rate control.   2)  Anticoagulation s/p ROSHAN occlusion on ASA.   3) HTN:  controlled. Continue Bystolic Lotrel, chlorthalidone   Wt loss, exercise, salt reduction  4) CAD: per Dr Jimenez. No anginal symptoms.       Return in about 1 year (around 1/21/2022) for SJM.        Scribed for Nando Zelaya MD by Edita Wolff PA-C. 1/21/2021  11:59 EST     I, Nando Zelaya MD, personally performed the services described in this documentation as scribed by the above named individual in my presence, and it is both accurate and complete.  1/21/2021  12:06 EST    "

## 2021-03-08 NOTE — PROGRESS NOTES
Subjective:     Encounter Date:03/10/2021    Patient ID: Markus Allen is a 75 y.o.  white male, [facility] retired /retired [place] , from [place], [place].      REFERRING PHYSICIAN/INTERNIST: Maxi Walker MD  [place] CARDIOLOGIST: Sami Bernal MD, [facility]  INTERVENTIONAL CARDIOLOGIST: Dhaval Joaquin MD, [facility], Psychiatric  ELECTROPHYSIOLOGIST: Nando Zelaya MD, [facility], [facility]  UROLOGIST: Bennett Garcia III, MD  HEMATOLOGIST: Dheeraj Knott MD   FORMER PHYSICIAN:  Edita Hall MD  CURRENT PHYSICIAN: Nila Rebollar DO  RADIOLOGIST: Kings Hooks MD    Chief Complaint:   Chief Complaint   Patient presents with   • Coronary Artery Disease       Problem List:  1. Ischemic heart disease:  a. Remote intermittent recurrent CCS class III-IV angina pectoris with abnormal positive intravenous adenosine SPECT/thallium-201 study/abnormal EKG with 3-vessel coronary atherosclerosis with severe 2-vessel branch involvement/acceptable left ventricular function, LVEF (0.70), December 1997.  b. Acceptable nondiagnostic borderline GXT, LVEF (0.72), July 2003.  c. Paroxysmal atrial fibrillation with abnormal myocardial perfusion study and subsequent catheterization studies demonstrating diffuse 3-vessel coronary atherosclerosis with 90% stenosis at origin of the mid portion of second obtuse marginal branch and nondominant left circumflex coronary artery treated with angioplasty and stent deployment using drug eluting stent with acceptable left ventricular function, LVEF (0.62), January 2005.  d. Abnormal IV [place] Quantitative SPECT gated Cardiolite study, LVEF (0.65), April 2010.  e. Residual CCS class I angina pectoris/NYHA class II to III exertional dyspnea and fatigue syndrome with recent [facility] ED evaluation for probable congestive heart failure, January 2018.  f. Echocardiogram 6/20/18: LA severely dilated, mild to moderate MR, mild-to-moderate TR, the following LV wall  segments are hypokinetic: Apical septal, mid inferoseptal, apex hypokinetic and mid anteroseptal.  LVEF 0.58, RV mildly dilated, no pericardial effusion, mild pulmonary hypertension, mild MAC present   g. CCS  class I chest discomfort/NYHA class I-II URIBE symptoms.  2. Severe hypertension/probably essential with remote normal selective bilateral renal arteriography, December 1997.  3. Dyslipidemia.  4. Remote tobacco abuse, resolved.  5. Abnormal chest x-ray.  6. Remote abnormal colonoscopy with findings of malignant polyp with subsequent sigmoid partial colectomy--data deficit, July 2014 with development of severe large incisional hernia.  7. Intermittent paroxysmal atrial fibrillation with oral anticoagulation and sotalol therapy and subsequently DC cardioversion, February 2005; March 2005, with subsequent sustained atrial fibrillation Tikosyn therapy followed by EP study and successful superior pulmonary vein isolation, as well as ablation of incessant right atrial tachycardia, May 2006 with subsequent apparent dual chamber pacemaker implant, July 2007 with subsequent EP study and AV node ablation with acceptable EP follow up, February 2009 with abnormal pacemaker interrogation with 29% AMS without reprogramming, August 2011, January 2012 with subsequent repeat XAVI and extensive attempt at ablation of atrial fibrillation with development of persistent atrial flutter, June 2012 with contemplated DC cardioversion, September 2012.   8. Watchman device implanted September 2015 by Dr. Zelaya.  a. Apparent recent pulse generator change; data deficit.  b. XAVI, November 2016; a 24 mm Watchman device visualized within left atrial appendage, device appears secure, small amount of fluid around one edge of the device measuring approximately 4 mm. The velocities within the device appeared to be 10-20 mmHg.  No thrombus or fibrinous strands appear to be in association with the device.  LVEF 0.60  c. Pacemaker  interrogation, June 2017; normal function, 100% mode switch, 95% RV paced; 9.5-10.5 years on battery, normal pacemaker check in  November 2018 with no nodes switches  d. Acceptable pacemaker interrogation, August 2020  9. Post procedure hematuria with hospitalization for hypokalemia, anemia, uncontrolled hematuria requiring cystoscopy and apparent cauterization of bladder tear -- data deficit ([facility]), May 2006 with progressive apparent lower tract obstructive symptoms and contemplated TURP - data deficit, June 2010.   10. Remote operations:  a. Upper extremity lipoma removal.  b. [place] teeth extraction  11. Morbid obesity, BMI 48.8  12. Apparent severe anemia with transfusion and subsequent eight courses of weekly IV iron therapy - data deficit, autumn 2012.   13. Contemplated DC cardioversion, September 2012, with device interrogation demonstrating persistent atrial fibrillation with VVI rate of 40 with 100% ventricular paced, September 2013 with subsequent redo pulmonary vein isolation procedure, June 2012 with subsequent internal cardioversion of atrial fibrillation to normal sinus rhythm, September 2012 with ERAF.  14. Remote apparent acceptable colonoscopy with abnormal EGD demonstrating Vallecillo’s esophagitis with apparent esophageal carcinoma with subsequent endoscopic EGD and scheduled PET scan with contemplated laparoscopic surgical resection as well as repair of possible adjunctive repair of large incisional ventral hernia with contemplated cardiothoracic surgical consultation at [facility] - data deficit, August-September 2014 with subsequent radiotherapy and chemotherapy with subsequent exploratory laparoscopic surgery with EGD without finding of residual cancer - data deficit, [facility], January 2015 with subsequent marked GI bleeding and apparent gastric ulcer - data deficit, [facility], February 2015.   15. [facility]  [facility] ED visit 1/6/18 for CHF symptoms with chest x-ray demonstrating cardiomegaly with pulmonary edema  16. Type 2 diabetes mellitus, hemoglobin A1c 6.9%, April 2019  17. Melanoma excision right face, summer 2019 - data deficit  18. Renal tumor with biopsy, February 2020 at [facility] with subsequent apparent percutaneous ablation- data deficit, Spring 2020  19. COVID-19 November 2020 with associated fatigue and loss of taste, no hospitalization  20. Kidney stones winter 2020    No Known Allergies    Current Outpatient Medications:   •  amLODIPine-benazepril (LOTREL) 10-20 MG per capsule, 1 capsule 2 (Two) Times a Day., Disp: , Rfl:   •  aspirin  MG tablet, Take 325 mg by mouth Daily., Disp: , Rfl:   •  BYSTOLIC 10 MG tablet, TAKE 1 TABLET BY MOUTH DAILY, Disp: 90 tablet, Rfl: 3  •  Calcium Carbonate-Vitamin D3 (CALCIUM 600+D3) 600-400 MG-UNIT tablet, Take 1 tablet by mouth 2 (Two) Times a Day., Disp: , Rfl:   •  cetirizine (zyrTEC) 10 MG tablet, Take 10 mg by mouth Daily., Disp: , Rfl:   •  chlorthalidone (HYGROTON) 25 MG tablet, Take 1 tablet by mouth Daily. (Patient taking differently: Take 12.5 mg by mouth Daily.), Disp: 90 tablet, Rfl: 1  •  eplerenone (INSPRA) 25 MG tablet, TAKE 1 TABLET BY MOUTH DAILY, Disp: 90 tablet, Rfl: 3  •  esomeprazole (NexIUM) 40 MG capsule, Take 40 mg by mouth daily., Disp: , Rfl:   •  fenofibrate 160 MG tablet, Take 160 mg by mouth Daily., Disp: , Rfl:   •  finasteride (PROSCAR) 5 MG tablet, Take 5 mg by mouth Daily., Disp: , Rfl: 3  •  Magnesium 400 MG capsule, Take 400 mg by mouth Daily., Disp: 90 capsule, Rfl: 1  •  metFORMIN (GLUCOPHAGE) 500 MG tablet, Take 500 mg by mouth 2 (Two) Times a Day With Meals., Disp: , Rfl:   •  nitroglycerin (NITROSTAT) 0.4 MG SL tablet, 1 under the tongue as needed for angina, may repeat q5mins for up three doses, Disp: 100 tablet, Rfl: 1  •  potassium chloride (K-TAB) 20 MEQ tablet controlled-release  ER tablet, Take 2 tablets by mouth 3 (Three) Times a Day With Meals. (Patient taking differently: Take 40 mEq by mouth 2 (Two) Times a Day.), Disp: 270 tablet, Rfl: 4  •  prenatal vitamin (prenatal, CLASSIC, vitamin) tablet, Take 1 tablet by mouth 2 (two) times a day., Disp: , Rfl:   •  rOPINIRole (REQUIP) 5 MG tablet, Take 5 mg by mouth Every Night., Disp: , Rfl:   •  rosuvastatin (CRESTOR) 40 MG tablet, TAKE 1 TABLET BY MOUTH EVERY DAY, Disp: 90 tablet, Rfl: 3  •  sertraline (ZOLOFT) 50 MG tablet, Take 50 mg by mouth 2 (Two) Times a Day., Disp: , Rfl:   •  tamsulosin (FLOMAX) 0.4 MG capsule 24 hr capsule, Take 1 capsule by mouth Every Night., Disp: , Rfl:   •  torsemide (DEMADEX) 20 MG tablet, TAKE 1 TABLET BY MOUTH EVERY DAY, Disp: 90 tablet, Rfl: 1    History of Present Illness: Patient returns for scheduled 7-month follow up.  In the interim around Thanksgiving 2020 he had COVID-19.  He had associated fatigue and loss of taste.  He denies any increased shortness of breath, fevers, or hospitalizations.  Since then he has had both of his Moderna Covid vaccinations.  He has also had kidney stones a couple times.  He did not have to have lithotripsy but got pain medication.  He passed his last kidney stone and has not had any recurrent problems.  He denies any chest pain, palpitations, dizziness, presyncope, syncope, or increased shortness of breath.  He tries to do as much activity as he can.  He has multiple hernias but does not want to have abdominal surgery.  He does not have any recent laboratory testing results to review. Patient has had no additional interim ER visits, hospitalizations, serious illnesses, or surgeries. He looks forward to fishing in [place] as well as the [employer] later this year.        Review of Systems   Genitourinary:        Recurrent kidney stones winter 2020      Obtained and negative except as outlined in problem list and HPI.    Procedures       Objective:       Vitals:  "   03/10/21 1046 03/10/21 1048   BP: 161/81 159/78   BP Location: Left arm Left arm   Patient Position: Sitting Standing   Pulse: 70 73   Temp: 98.4 °F (36.9 °C)    SpO2: 94%    Weight: (!) 138 kg (304 lb 3.2 oz)    Height: 188 cm (74\")    Recheck blood pressure left arm sitting was 132/64  Body mass index is 39.06 kg/m².  Last weight: 302 lbs    Vitals reviewed.   Constitutional:       Appearance: Well-developed.   Neck:      Thyroid: No thyromegaly.      Vascular: No carotid bruit or JVD.      Lymphadenopathy: No cervical adenopathy.   Pulmonary:      Effort: Pulmonary effort is normal.      Breath sounds: Decreased breath sounds present. No wheezing. No rhonchi. No rales.   Cardiovascular:      Regular rhythm.      Murmurs: There is a grade 2/6 mid frequency harsh midsystolic murmur at the URSB and LLSB.      No gallop. No S3 gallop.   Pulses:     Dorsalis pedis: 1+ bilaterally.     Posterior tibial: 1+ bilaterally.  Edema:     Peripheral edema absent.   Abdominal:      Palpations: Abdomen is soft. There is no abdominal mass.      Tenderness: There is no abdominal tenderness. There is no guarding.   Musculoskeletal: Normal range of motion. Skin:     General: Skin is warm and dry.      Findings: No rash.      Comments: Left precordial pacemaker site nominal   Neurological:      Mental Status: Alert and oriented to person, place, and time.           Lab Review:     10/22/2020 (reviewed with patient by letter):  · CBC: hematocrit 41%, hemoglobin 13.1, WBC 09352 and normal indices and platelet count, with mild left shift  · Urinalysis: trace leukocyte esterase without significant RBCs or bacteria.      Assessment:       Overall continued acceptable course with no new interim cardiopulmonary complaints with acceptable functional status on limited activity. We will defer additional diagnostic or therapeutic intervention from a cardiac perspective at this time.     Diagnosis Plan   1. Ischemic heart disease  No " recurrent angina pectoris or CHF on current activity schedule; continue current treatment   2. Persistent atrial fibrillation (CMS/HCC)  Stable   3. Essential hypertension  Controlled, continue current cardiac medications   4. Dyslipidemia  No new labs to review, continue rosuvastatin          Plan:         1. Patient to continue current medications and close follow up with the above providers.  2. Tentative cardiology follow up in September 2021 or patient may return sooner PRN.    Scribed for Cornelio Jimenez MD by Luisana Moore, [facility]. 3/10/2021  11:13 EST    I, Cornelio Jimenez MD, [facility], personally performed the services described in this documentation as scribed by the above named individual in my presence, and it is both accurate and complete. At 11:43 EST on 03/10/2021

## 2021-03-10 ENCOUNTER — OFFICE VISIT (OUTPATIENT)
Dept: CARDIOLOGY | Facility: CLINIC | Age: 76
End: 2021-03-10

## 2021-03-10 VITALS
SYSTOLIC BLOOD PRESSURE: 159 MMHG | WEIGHT: 304.2 LBS | HEART RATE: 73 BPM | TEMPERATURE: 98.4 F | BODY MASS INDEX: 39.04 KG/M2 | HEIGHT: 74 IN | DIASTOLIC BLOOD PRESSURE: 78 MMHG | OXYGEN SATURATION: 94 %

## 2021-03-10 DIAGNOSIS — I25.9 ISCHEMIC HEART DISEASE: Primary | ICD-10-CM

## 2021-03-10 DIAGNOSIS — I10 ESSENTIAL HYPERTENSION: ICD-10-CM

## 2021-03-10 DIAGNOSIS — E78.5 DYSLIPIDEMIA: ICD-10-CM

## 2021-03-10 DIAGNOSIS — I48.19 PERSISTENT ATRIAL FIBRILLATION (HCC): ICD-10-CM

## 2021-03-10 PROCEDURE — 99214 OFFICE O/P EST MOD 30 MIN: CPT | Performed by: INTERNAL MEDICINE

## 2021-03-16 RX ORDER — CHLORTHALIDONE 25 MG/1
12.5 TABLET ORAL DAILY
Qty: 45 TABLET | Refills: 3 | Status: SHIPPED | OUTPATIENT
Start: 2021-03-16 | End: 2022-03-16

## 2021-04-24 PROCEDURE — 93294 REM INTERROG EVL PM/LDLS PM: CPT | Performed by: INTERNAL MEDICINE

## 2021-04-24 PROCEDURE — 93296 REM INTERROG EVL PM/IDS: CPT | Performed by: INTERNAL MEDICINE

## 2021-06-02 RX ORDER — NEBIVOLOL HYDROCHLORIDE 10 MG/1
TABLET ORAL
Qty: 90 TABLET | Refills: 3 | Status: SHIPPED | OUTPATIENT
Start: 2021-06-02 | End: 2022-06-06

## 2021-07-16 DIAGNOSIS — I25.10 CORONARY ARTERY DISEASE INVOLVING NATIVE CORONARY ARTERY OF NATIVE HEART WITHOUT ANGINA PECTORIS: ICD-10-CM

## 2021-07-16 RX ORDER — TORSEMIDE 20 MG/1
TABLET ORAL
Qty: 90 TABLET | Refills: 3 | Status: SHIPPED | OUTPATIENT
Start: 2021-07-16 | End: 2021-09-15 | Stop reason: DRUGHIGH

## 2021-07-24 PROCEDURE — 93296 REM INTERROG EVL PM/IDS: CPT | Performed by: INTERNAL MEDICINE

## 2021-07-24 PROCEDURE — 93294 REM INTERROG EVL PM/LDLS PM: CPT | Performed by: INTERNAL MEDICINE

## 2021-09-15 ENCOUNTER — OFFICE VISIT (OUTPATIENT)
Dept: CARDIOLOGY | Facility: CLINIC | Age: 76
End: 2021-09-15

## 2021-09-15 VITALS
DIASTOLIC BLOOD PRESSURE: 78 MMHG | WEIGHT: 309 LBS | SYSTOLIC BLOOD PRESSURE: 136 MMHG | HEIGHT: 74 IN | HEART RATE: 73 BPM | OXYGEN SATURATION: 92 % | BODY MASS INDEX: 39.66 KG/M2

## 2021-09-15 DIAGNOSIS — E78.5 DYSLIPIDEMIA: ICD-10-CM

## 2021-09-15 DIAGNOSIS — I10 ESSENTIAL HYPERTENSION: ICD-10-CM

## 2021-09-15 DIAGNOSIS — I48.19 PERSISTENT ATRIAL FIBRILLATION (HCC): ICD-10-CM

## 2021-09-15 DIAGNOSIS — I25.9 ISCHEMIC HEART DISEASE: Primary | ICD-10-CM

## 2021-09-15 PROCEDURE — 99214 OFFICE O/P EST MOD 30 MIN: CPT | Performed by: INTERNAL MEDICINE

## 2021-09-15 RX ORDER — TORSEMIDE 20 MG/1
20 TABLET ORAL DAILY
Qty: 135 TABLET | Refills: 3 | Status: SHIPPED | OUTPATIENT
Start: 2021-09-15

## 2021-09-15 NOTE — PROGRESS NOTES
Subjective:     Encounter Date:09/15/2021    Patient ID: Markus Allen is a 75 y.o.  white male, [facility] retired /retired [employer] , from [place], [place].      REFERRING PHYSICIAN/INTERNIST: Maxi Walker MD  [place] CARDIOLOGIST: Sami Bernal MD, [facility]  INTERVENTIONAL CARDIOLOGIST: Dhaval Joaquin MD, [facility], [facility]  ELECTROPHYSIOLOGIST: Nando Zelaya MD, [facility], [facility]  UROLOGIST: Bennett Garcia III, MD  HEMATOLOGIST: Dheeraj Knott MD   FORMER PHYSICIAN:  Edita Hall MD  CURRENT PHYSICIAN: Nila Rebollar DO  RADIOLOGIST: Kings Hooks MD    Chief Complaint:   Chief Complaint   Patient presents with   • Coronary Artery Disease   • Hypertension   • Atrial Fibrillation   • IHD       Problem List:  1. Ischemic heart disease:  a. Remote intermittent recurrent CCS class III-IV angina pectoris with abnormal positive intravenous adenosine SPECT/thallium-201 study/abnormal EKG with 3-vessel coronary atherosclerosis with severe 2-vessel branch involvement/acceptable left ventricular function, LVEF (0.70), December 1997.  b. Acceptable nondiagnostic borderline GXT, LVEF (0.72), July 2003.  c. Paroxysmal atrial fibrillation with abnormal myocardial perfusion study and subsequent catheterization studies demonstrating diffuse 3-vessel coronary atherosclerosis with 90% stenosis at origin of the mid portion of second obtuse marginal branch and nondominant left circumflex coronary artery treated with angioplasty and stent deployment using drug eluting stent with acceptable left ventricular function, LVEF (0.62), January 2005.  d. Abnormal IV Adenowalk Quantitative SPECT gated Cardiolite study, LVEF (0.65), April 2010.  e. Residual CCS class I angina pectoris/NYHA class II to III exertional dyspnea and fatigue syndrome with recent [facility] ED evaluation for probable congestive heart failure, January 2018.  f. Echocardiogram 6/20/18: LA severely dilated, mild to  moderate MR, mild-to-moderate TR, the following LV wall segments are hypokinetic: Apical septal, mid inferoseptal, apex hypokinetic and mid anteroseptal.  LVEF 0.58, RV mildly dilated, no pericardial effusion, mild pulmonary hypertension, mild MAC present   g. CCS  class I chest discomfort/NYHA class I-II URIBE symptoms, September 2021.  2. Severe hypertension/probably essential with remote normal selective bilateral renal arteriography, December 1997.  3. Dyslipidemia.  4. Remote tobacco abuse, resolved.  5. Abnormal chest x-ray.  6. Remote abnormal colonoscopy with findings of malignant polyp with subsequent sigmoid partial colectomy--data deficit, July 2014 with development of severe large incisional hernia.  7. Intermittent paroxysmal atrial fibrillation with oral anticoagulation and sotalol therapy and subsequently DC cardioversion, February 2005; March 2005, with subsequent sustained atrial fibrillation Tikosyn therapy followed by EP study and successful superior pulmonary vein isolation, as well as ablation of incessant right atrial tachycardia, May 2006 with subsequent apparent dual chamber pacemaker implant, July 2007 with subsequent EP study and AV node ablation with acceptable EP follow up, February 2009 with abnormal pacemaker interrogation with 29% AMS without reprogramming, August 2011, January 2012 with subsequent repeat XAVI and extensive attempt at ablation of atrial fibrillation with development of persistent atrial flutter, June 2012 with contemplated DC cardioversion, September 2012.   8. Watchman device implanted September 2015 by Dr. Zelaya.  a. Apparent recent pulse generator change; data deficit.  b. XAVI, November 2016; a 24 mm Watchman device visualized within left atrial appendage, device appears secure, small amount of fluid around one edge of the device measuring approximately 4 mm. The velocities within the device appeared to be 10-20 mmHg.  No thrombus or fibrinous strands appear to be  in association with the device.  LVEF 0.60  c. Pacemaker interrogation, June 2017; normal function, 100% mode switch, 95% RV paced; 9.5-10.5 years on battery, normal pacemaker check in  November 2018 with no nodes switches  d. Acceptable pacemaker interrogation, August 2020  9. Post procedure hematuria with hospitalization for hypokalemia, anemia, uncontrolled hematuria requiring cystoscopy and apparent cauterization of bladder tear -- data deficit ([facility]), May 2006 with progressive apparent lower tract obstructive symptoms and contemplated TURP - data deficit, June 2010.   10. Remote operations:  a. Upper extremity lipoma removal.  b. [place] teeth extraction  11. Morbid obesity, BMI 48.8  12. Apparent severe anemia with transfusion and subsequent eight courses of weekly IV iron therapy - data deficit, autumn 2012.   13. Contemplated DC cardioversion, September 2012, with device interrogation demonstrating persistent atrial fibrillation with VVI rate of 40 with 100% ventricular paced, September 2013 with subsequent redo pulmonary vein isolation procedure, June 2012 with subsequent internal cardioversion of atrial fibrillation to normal sinus rhythm, September 2012 with ERAF.  14. Remote apparent acceptable colonoscopy with abnormal EGD demonstrating Vallecillo’s esophagitis with apparent esophageal carcinoma with subsequent endoscopic EGD and scheduled PET scan with contemplated laparoscopic surgical resection as well as repair of possible adjunctive repair of large incisional ventral hernia with contemplated cardiothoracic surgical consultation at [facility] - data deficit, August-September 2014 with subsequent radiotherapy and chemotherapy with subsequent exploratory laparoscopic surgery with EGD without finding of residual cancer - data deficit, [facility], January 2015 with subsequent marked GI bleeding and apparent gastric ulcer - data  deficit, [facility], February 2015.   15. [facility] ED visit 1/6/18 for CHF symptoms with chest x-ray demonstrating cardiomegaly with pulmonary edema  16. Type 2 diabetes mellitus, hemoglobin A1c 6.9%, April 2019  17. Melanoma excision right face, summer 2019 - data deficit  18. Renal tumor with biopsy, February 2020 at [facility] with subsequent apparent percutaneous ablation- data deficit, Spring 2020  19. COVID-19 November 2020 with associated fatigue and loss of taste, no hospitalization  20. Kidney stones winter 2020  21. Kidney tumor resection, winter 2021.    No Known Allergies    Current Outpatient Medications:   •  amLODIPine-benazepril (LOTREL) 10-20 MG per capsule, 1 capsule 2 (Two) Times a Day., Disp: , Rfl:   •  aspirin  MG tablet, Take 325 mg by mouth Daily., Disp: , Rfl:   •  Bystolic 10 MG tablet, TAKE 1 TABLET BY MOUTH DAILY, Disp: 90 tablet, Rfl: 3  •  Calcium Carbonate-Vitamin D3 (CALCIUM 600+D3) 600-400 MG-UNIT tablet, Take 1 tablet by mouth 2 (Two) Times a Day., Disp: , Rfl:   •  cetirizine (zyrTEC) 10 MG tablet, Take 10 mg by mouth Daily., Disp: , Rfl:   •  chlorthalidone (HYGROTON) 25 MG tablet, Take 0.5 tablets by mouth Daily., Disp: 45 tablet, Rfl: 3  •  eplerenone (INSPRA) 25 MG tablet, TAKE 1 TABLET BY MOUTH DAILY, Disp: 90 tablet, Rfl: 3  •  esomeprazole (NexIUM) 40 MG capsule, Take 40 mg by mouth daily., Disp: , Rfl:   •  fenofibrate 160 MG tablet, Take 160 mg by mouth Daily., Disp: , Rfl:   •  finasteride (PROSCAR) 5 MG tablet, Take 5 mg by mouth Daily., Disp: , Rfl: 3  •  Magnesium 400 MG capsule, Take 400 mg by mouth Daily. (Patient taking differently: Take 400 mg by mouth 2 (two) times a day.), Disp: 90 capsule, Rfl: 1  •  metFORMIN (GLUCOPHAGE) 500 MG tablet, Take 500 mg by mouth 2 (Two) Times a Day With Meals., Disp: , Rfl:   •  nitroglycerin (NITROSTAT) 0.4 MG SL tablet, 1 under the tongue as needed for angina, may  repeat q5mins for up three doses, Disp: 100 tablet, Rfl: 1  •  potassium chloride (K-TAB) 20 MEQ tablet controlled-release ER tablet, Take 2 tablets by mouth 3 (Three) Times a Day With Meals. (Patient taking differently: Take 40 mEq by mouth 4 (Four) Times a Day.), Disp: 270 tablet, Rfl: 4  •  prenatal vitamin (prenatal, CLASSIC, vitamin) tablet, Take 1 tablet by mouth 2 (two) times a day., Disp: , Rfl:   •  rOPINIRole (REQUIP) 5 MG tablet, Take 5 mg by mouth Every Night., Disp: , Rfl:   •  rosuvastatin (CRESTOR) 40 MG tablet, TAKE 1 TABLET BY MOUTH EVERY DAY, Disp: 90 tablet, Rfl: 3  •  sertraline (ZOLOFT) 50 MG tablet, Take 50 mg by mouth Daily., Disp: , Rfl:   •  tamsulosin (FLOMAX) 0.4 MG capsule 24 hr capsule, Take 1 capsule by mouth Every Night., Disp: , Rfl:   •  torsemide (DEMADEX) 20 MG tablet, TAKE 1 TABLET BY MOUTH EVERY DAY, Disp: 90 tablet, Rfl: 3    History of Present Illness: Patient returns for scheduled 6-month follow up. He has continued to experience exertional dyspnea and has obtained a portable oxygen machine but has not started using it yet. He has had pulmonary evaluation and testing with Dr. Rebollar and home oxygen was prescribed. He has not seen a pulmonologist. He reports he had a chest x-ray which was apparently normal; data deficit. He has also had lower extremity swelling. Patient otherwise denies chest pain, palpitations, dizziness, and syncope. He has received COVID immunization. He reports his blood pressure has been elevated on occasion and especially if his oxygen saturation is low, his blood pressure will be elevated. He had a kidney tumor resected in winter 2021 and he has been doing well with this. He has had no additional interim ER visits, hospitalizations, serious illnesses, or surgeries.      ROS   Obtained and negative except as outlined in problem list and HPI.    Procedures       Objective:       Vitals:    09/15/21 1112 09/15/21 1122 09/15/21 1135   BP: 165/84 168/73  "136/78   BP Location: Left arm Left arm Left arm   Patient Position: Sitting Standing Sitting   Pulse: 71 73    SpO2: 92%     Weight: (!) 140 kg (309 lb)     Height: 188 cm (74.02\")       Body mass index is 39.66 kg/m².  Last weight: 304 lbs    Vitals reviewed.   Constitutional:       Appearance: Well-developed.   Neck:      Thyroid: No thyromegaly.      Vascular: No carotid bruit or JVD.      Lymphadenopathy: No cervical adenopathy.   Pulmonary:      Effort: Pulmonary effort is normal.      Breath sounds: Normal breath sounds. No wheezing. No rhonchi. No rales.   Chest:      Comments: Left precordial pacemaker site is nominal.  Cardiovascular:      Regular rhythm.      Murmurs: There is a grade 2/6 harsh midsystolic murmur at the URSB, radiating to the neck.      No gallop. No S3 gallop.   Pulses:     Dorsalis pedis: 1+ bilaterally.     Posterior tibial: 1+ bilaterally.  Edema:     Pretibial: bilateral 1+ edema of the pretibial area.     Ankle: bilateral 1+ edema of the ankle.  Abdominal:      Palpations: Abdomen is soft. There is no abdominal mass.      Tenderness: There is no abdominal tenderness.   Musculoskeletal: Normal range of motion. Skin:     General: Skin is warm and dry.      Findings: No rash.   Neurological:      Mental Status: Alert and oriented to person, place, and time.           Lab Review:     7/13/2021 (reviewed with patient by letter - continue current treatment):  • CBC: hematocrit 40%, hemoglobin 13.3, WBC 8700 and normal indices, platelet count and differential  • CMP: normal electrolytes with excellent kidney function, serum creatinine 1.0, BUN 19, glucose 120 and normal serum calcium and liver function tests.   • Serum albumin: 4.5  • A1c: 7%  • Serum iron saturation: 15%  • Vitamin B12: normal  • Vitamin D: 27.6    MURJ. 7/14/2021:  Normal device function  No events or alerts  Battery is at 95.5% (10.92 years)  Sensing, impedance, and thresholds reviewed        Assessment:       Overall " continued acceptable course with no new interim cardiopulmonary complaints with fair functional status. We will defer additional diagnostic or therapeutic intervention from a cardiac perspective at this time.     Diagnosis Plan   1. Ischemic heart disease  No progressive angina pectoris or CHF on current activity schedule; continue current treatment   2. Persistent atrial fibrillation (CMS/HCC)  Stable without documented recurrence. Continue current treatment.   3. Essential hypertension  Acceptable control. Continue current treatment.   4. Dyslipidemia  No FLP to review. Continue rosuvastatin and fenofibrate.          Plan:         1. Patient to continue current medications and close follow up with the above providers.  2. Increase torsemide; take 20 mg daily with an additional 20 mg every other day.  3. Tentative cardiology follow up in March 2022 or patient may return sooner PRN.    I, José Miguel Saez, attest that this documentation has been prepared under the direction and in the presence of Cornelio Jimenez MD 09/15/2021    ICornelio MD, [facility], personally performed the services described in this documentation as scribed by the above named individual in my presence, and it is both accurate and complete. At 13:04 EDT on 09/15/2021

## 2021-10-18 DIAGNOSIS — I25.10 CORONARY ARTERY DISEASE INVOLVING NATIVE CORONARY ARTERY OF NATIVE HEART WITHOUT ANGINA PECTORIS: ICD-10-CM

## 2021-10-18 DIAGNOSIS — I10 ESSENTIAL HYPERTENSION: ICD-10-CM

## 2021-10-18 RX ORDER — EPLERENONE 25 MG/1
TABLET, FILM COATED ORAL
Qty: 90 TABLET | Refills: 3 | Status: SHIPPED | OUTPATIENT
Start: 2021-10-18 | End: 2022-11-01

## 2021-10-23 PROCEDURE — 93294 REM INTERROG EVL PM/LDLS PM: CPT | Performed by: INTERNAL MEDICINE

## 2021-10-23 PROCEDURE — 93296 REM INTERROG EVL PM/IDS: CPT | Performed by: INTERNAL MEDICINE

## 2022-01-12 ENCOUNTER — TELEPHONE (OUTPATIENT)
Dept: CARDIOLOGY | Facility: CLINIC | Age: 77
End: 2022-01-12

## 2022-01-12 NOTE — TELEPHONE ENCOUNTER
Spoke with patient sister in law regarding patient's home monitor. I told her that the monitor wasn't connecting. She said that he called earlier saying that the monitor's lights were flashing. She told me a lady said that the monitor was updating software. I told her to make sure that is what its doing to call the number that is on the monitor. She was agreeable to this.

## 2022-01-13 ENCOUNTER — TELEPHONE (OUTPATIENT)
Dept: CARDIOLOGY | Facility: CLINIC | Age: 77
End: 2022-01-13

## 2022-01-13 NOTE — TELEPHONE ENCOUNTER
Called pt to check on his merlin monitor. It is not working and [employer] is sending a cell adaptor.They will call if they need assistance.

## 2022-01-22 PROCEDURE — 93294 REM INTERROG EVL PM/LDLS PM: CPT | Performed by: INTERNAL MEDICINE

## 2022-01-22 PROCEDURE — 93296 REM INTERROG EVL PM/IDS: CPT | Performed by: INTERNAL MEDICINE

## 2022-02-17 ENCOUNTER — OFFICE VISIT (OUTPATIENT)
Dept: CARDIOLOGY | Facility: CLINIC | Age: 77
End: 2022-02-17

## 2022-02-17 VITALS
HEART RATE: 70 BPM | SYSTOLIC BLOOD PRESSURE: 138 MMHG | OXYGEN SATURATION: 92 % | HEIGHT: 74 IN | BODY MASS INDEX: 37.86 KG/M2 | WEIGHT: 295 LBS | DIASTOLIC BLOOD PRESSURE: 78 MMHG

## 2022-02-17 DIAGNOSIS — I25.10 CORONARY ARTERY DISEASE INVOLVING NATIVE CORONARY ARTERY OF NATIVE HEART WITHOUT ANGINA PECTORIS: ICD-10-CM

## 2022-02-17 DIAGNOSIS — E66.8 MODERATE OBESITY: ICD-10-CM

## 2022-02-17 DIAGNOSIS — I48.21 PERMANENT ATRIAL FIBRILLATION: Primary | ICD-10-CM

## 2022-02-17 DIAGNOSIS — I10 ESSENTIAL HYPERTENSION: ICD-10-CM

## 2022-02-17 PROCEDURE — 99213 OFFICE O/P EST LOW 20 MIN: CPT | Performed by: INTERNAL MEDICINE

## 2022-02-17 PROCEDURE — 93279 PRGRMG DEV EVAL PM/LDLS PM: CPT | Performed by: INTERNAL MEDICINE

## 2022-02-17 NOTE — PROGRESS NOTES
Markus Allen  1945  [phone]    02/17/2022    [facility] CARDIOLOGY     Referring Provider: No ref. provider found     Nila Rebollar,   [address] [address]    Chief Complaint   Patient presents with   • Atrial Fibrillation       Problem List:     1. Permanent atrial fibrillation  1. AV node ablation and pacemaker implantation  2. Oral anticoagulation  1. Watchman device implanted September 2015 by Dr. Zelaya.  2. XAVI, November 2016; a 24 mm Watchman device visualized within left atrial appendage, device appears secure, small amount of fluid around one edge of the device measuring approximately 4 mm. The velocities within the device appeared to be 10-20 mmHg.  No thrombus or fibrinous strands appear to be in association with the device.  LVEF 0.60  3. Ischemic heart disease:  a. Remote intermittent recurrent CCS class III-IV angina pectoris with abnormal positive intravenous adenosine SPECT/thallium-201 study/abnormal EKG with 3-vessel coronary atherosclerosis with severe 2-vessel branch involvement/acceptable left ventricular function, LVEF (0.70), December 1997.  b. Heart catheterization studies demonstrating diffuse 3-vessel coronary atherosclerosis with 90% stenosis at origin of the mid portion of second obtuse marginal branch and nondominant left circumflex coronary artery treated with angioplasty and stent deployment using drug eluting stent with acceptable left ventricular function, LVEF (0.62), January 2005.  c. Abnormal IV Adenowalk Quantitative SPECT gated Cardiolite study, LVEF (0.65), April 2010.  d. Residual CCS class I angina pectoris/NYHA class II to III exertional dyspnea and fatigue syndrome with recent [facility] ED evaluation for probable congestive heart failure, January 2018.  e. Echocardiogram 6/20/18: LA severely dilated, mild to moderate MR, mild-to-moderate TR, the following LV wall segments are hypokinetic: Apical septal, mid  inferoseptal, apex hypokinetic and mid anteroseptal.  LVEF 0.58, RV mildly dilated, no pericardial effusion, mild pulmonary hypertension, mild MAC present   4. Hypertension  5. Dyslipidemia.  6. Remote tobacco abuse, resolved.  7. Remote abnormal colonoscopy with findings of malignant polyp with subsequent sigmoid partial colectomy--data deficit, July 2014 with development of severe large incisional hernia.  8. Post procedure hematuria with hospitalization for hypokalemia, anemia, uncontrolled hematuria requiring cystoscopy and apparent cauterization of bladder tear -- data deficit ([facility]), May 2006 with progressive apparent lower tract obstructive symptoms and contemplated TURP - data deficit, June 2010.   9. Remote operations:  a. Upper extremity lipoma removal.  b. [place] teeth extraction  10. Morbid obesity, BMI 48.8  11. Apparent severe anemia with transfusion and subsequent eight courses of weekly IV iron therapy - data deficit, autumn 2012.   12. Remote apparent acceptable colonoscopy with abnormal EGD demonstrating Vallecillo’s esophagitis with apparent esophageal carcinoma with subsequent endoscopic EGD and scheduled PET scan with contemplated laparoscopic surgical resection as well as repair of possible adjunctive repair of large incisional ventral hernia with contemplated cardiothoracic surgical consultation at [facility] - data deficit, August-September 2014 with subsequent radiotherapy and chemotherapy with subsequent exploratory laparoscopic surgery with EGD without finding of residual cancer - data deficit, [facility], January 2015 with subsequent marked GI bleeding and apparent gastric ulcer - data deficit, [facility], February 2015.   13. Type 2 diabetes mellitus, hemoglobin A1c 6.9%, April 2019  14. Melanoma excision right face, summer 2019 - data deficit  15. Renal tumor with biopsy, February 2020 at  [facility] with subsequent apparent percutaneous ablation- data deficit, Spring 2020    Allergies  No Known Allergies    Current Medications    Current Outpatient Medications:   •  amLODIPine-benazepril (LOTREL) 10-20 MG per capsule, 1 capsule 2 (Two) Times a Day., Disp: , Rfl:   •  aspirin  MG tablet, Take 325 mg by mouth Daily., Disp: , Rfl:   •  Bystolic 10 MG tablet, TAKE 1 TABLET BY MOUTH DAILY, Disp: 90 tablet, Rfl: 3  •  Calcium Carbonate-Vitamin D3 (CALCIUM 600+D3) 600-400 MG-UNIT tablet, Take 1 tablet by mouth 2 (Two) Times a Day., Disp: , Rfl:   •  cetirizine (zyrTEC) 10 MG tablet, Take 10 mg by mouth Daily., Disp: , Rfl:   •  chlorthalidone (HYGROTON) 25 MG tablet, Take 0.5 tablets by mouth Daily., Disp: 45 tablet, Rfl: 3  •  eplerenone (INSPRA) 25 MG tablet, TAKE 1 TABLET BY MOUTH EVERY DAY, Disp: 90 tablet, Rfl: 3  •  esomeprazole (NexIUM) 40 MG capsule, Take 40 mg by mouth daily., Disp: , Rfl:   •  fenofibrate 160 MG tablet, Take 160 mg by mouth Daily., Disp: , Rfl:   •  finasteride (PROSCAR) 5 MG tablet, Take 5 mg by mouth Daily., Disp: , Rfl: 3  •  Magnesium 400 MG capsule, Take 400 mg by mouth Daily. (Patient taking differently: Take 400 mg by mouth 2 (two) times a day.), Disp: 90 capsule, Rfl: 1  •  metFORMIN (GLUCOPHAGE) 500 MG tablet, Take 500 mg by mouth 2 (Two) Times a Day With Meals., Disp: , Rfl:   •  nitroglycerin (NITROSTAT) 0.4 MG SL tablet, 1 under the tongue as needed for angina, may repeat q5mins for up three doses, Disp: 100 tablet, Rfl: 1  •  potassium chloride (K-TAB) 20 MEQ tablet controlled-release ER tablet, Take 2 tablets by mouth 3 (Three) Times a Day With Meals. (Patient taking differently: Take 40 mEq by mouth 4 (Four) Times a Day.), Disp: 270 tablet, Rfl: 4  •  prenatal vitamin (prenatal, CLASSIC, vitamin) tablet, Take 1 tablet by mouth 2 (two) times a day., Disp: , Rfl:   •  rOPINIRole (REQUIP) 5 MG tablet, Take 5 mg by mouth Every Night., Disp: , Rfl:  "  •  rosuvastatin (CRESTOR) 40 MG tablet, TAKE 1 TABLET BY MOUTH EVERY DAY, Disp: 90 tablet, Rfl: 3  •  sertraline (ZOLOFT) 50 MG tablet, Take 50 mg by mouth Daily., Disp: , Rfl:   •  tamsulosin (FLOMAX) 0.4 MG capsule 24 hr capsule, Take 1 capsule by mouth Every Night., Disp: , Rfl:   •  torsemide (DEMADEX) 20 MG tablet, Take 1 tablet by mouth Daily. Take two tablets every other day, alternating between 20 and 40 mg daily, Disp: 135 tablet, Rfl: 3    History of Present Illness     Pt presents for follow up of AF/AVB/ROSHAN occlusion. Since we last saw the pt, pt denies any tachypalpitations, CP, LH, and dizziness. Denies any hospitalizations, ER visits, bleeding, or TIA/CVA symptoms. Overall feels ok.  Does continue to have lower extremity edema although it is better since Dr. Jimenez adjusted his medications.  Blood pressures at home of been stable.  He does note dyspnea on exertion which has been chronic and unchanged.    ROS:  General: + fatigue, no weight gain or loss  Cardiovascular:  Denies CP, PND, syncope, near syncope, edema or palpitations.  Pulmonary: + chronic URIBE, no cough, or wheezing      Vitals:    02/17/22 1053   BP: 138/78   BP Location: Left arm   Patient Position: Sitting   Pulse: 70   SpO2: 92%   Weight: 134 kg (295 lb)   Height: 188 cm (74\")     Body mass index is 37.88 kg/m².  PE:  General: NAD  Neck: no JVD, no carotid bruits, no TM  Heart RRR, NL S1, S2, PMI not appreciated.  Lungs: CTA, no wheezes, rhonchi, or rales  Abd: soft, non-tender, NL BS, large ventral hernia noted.  Ext: No musculoskeletal deformities, + edema, no cyanosis, or clubbing  Psych: normal mood and affect    Diagnostic Data:    [facility] single chamber pacemaker: VVIR 70; RV paced >99%. Threshold 0.75 @0.5ms; impedence 400. Battery 10-11 years. No events.     Procedures    1. Permanent atrial fibrillation (HCC)    2. Essential hypertension    3. Coronary artery disease involving native coronary artery of native heart without " angina pectoris    4. Moderate obesity          Plan:    1) Permanent atrial fibrillation: s/p AVN and VVIR Pacemaker. Stable function on device check today. Heart rate control.   Pacemaker function normal.  2)  Anticoagulation s/p ROSHAN occlusion with Watchman device on ASA due to recurrent GI bleed in the past.  No CVA/ TIA symptoms.  Doing well.  3) HTN:  controlled. Continue Bystolic Lotrel, chlorthalidone   Wt loss, exercise, salt reduction  4) CAD: per Dr Jimenez. No anginal symptoms.     F/up in 12 months

## 2022-03-16 RX ORDER — CHLORTHALIDONE 25 MG/1
TABLET ORAL
Qty: 45 TABLET | Refills: 3 | Status: SHIPPED | OUTPATIENT
Start: 2022-03-16 | End: 2023-02-10

## 2022-06-06 RX ORDER — NEBIVOLOL 10 MG/1
TABLET ORAL
Qty: 90 TABLET | Refills: 3 | Status: SHIPPED | OUTPATIENT
Start: 2022-06-06

## 2022-06-23 NOTE — PROGRESS NOTES
Subjective:     Encounter Date:06/24/2022    Patient ID: Markus Allen is a 76 y.o.  white male, [facility] retired /retired [employer] , from [place], [place].      REFERRING PHYSICIAN/INTERNIST: Maxi Walker MD  [place] CARDIOLOGIST: Sami Bernal MD, [facility]  INTERVENTIONAL CARDIOLOGIST: Dhaval Joaquin MD, [facility], [facility]  ELECTROPHYSIOLOGIST: Nando Zelaya MD, [facility], [facility]  UROLOGIST: Bennett Garcia III, MD  HEMATOLOGIST: Dheeraj Knott MD   FORMER PHYSICIAN:  Edita Hall MD  CURRENT PHYSICIAN: Nila Rebollar DO  RADIOLOGIST: Kings Hooks MD    Chief Complaint:   Chief Complaint   Patient presents with   • Ischemic heart disease       Problem List:  1. Ischemic heart disease:  a. Remote intermittent recurrent CCS class III-IV angina pectoris with abnormal positive intravenous adenosine SPECT/thallium-201 study/abnormal EKG with 3-vessel coronary atherosclerosis with severe 2-vessel branch involvement/acceptable left ventricular function, LVEF (0.70), December 1997.  b. Acceptable nondiagnostic borderline GXT, LVEF (0.72), July 2003.  c. Paroxysmal atrial fibrillation with abnormal myocardial perfusion study and subsequent catheterization studies demonstrating diffuse 3-vessel coronary atherosclerosis with 90% stenosis at origin of the mid portion of second obtuse marginal branch and nondominant left circumflex coronary artery treated with angioplasty and stent deployment using drug eluting stent with acceptable left ventricular function, LVEF (0.62), January 2005.  d. Abnormal IV Adenowalk Quantitative SPECT gated Cardiolite study, LVEF (0.65), April 2010.  e. Residual CCS class I angina pectoris/NYHA class II to III exertional dyspnea and fatigue syndrome with recent [facility] ED evaluation for probable congestive heart failure, January 2018.  f. Echocardiogram 6/20/18: LA severely dilated, mild to moderate MR, mild-to-moderate TR, the following LV wall  segments are hypokinetic: Apical septal, mid inferoseptal, apex hypokinetic and mid anteroseptal.  LVEF 0.58, RV mildly dilated, no pericardial effusion, mild pulmonary hypertension, mild MAC present   g. CCS  class I chest discomfort/NYHA class I-II URIBE symptoms, September 2021.  h. Echocardiogram, 6/13/2022 ([facility]): EF 55-60%. Grade II diastolic dysfunction. LA dilation. RV enlargement. Mild MR, TR, and PI. PHTN. RVSP 61 mmHg.   i. Residual CCS 0 angina pectoris/NYHA class II exertional dyspnea and fatigue, June 2022  2. Severe hypertension/probably essential with remote normal selective bilateral renal arteriography, December 1997.  3. Dyslipidemia.  4. Remote tobacco abuse, resolved.  5. Abnormal chest x-ray.  6. Remote abnormal colonoscopy with findings of malignant polyp with subsequent sigmoid partial colectomy--data deficit, July 2014 with development of severe large incisional hernia.  7. Intermittent paroxysmal atrial fibrillation with oral anticoagulation and sotalol therapy and subsequently DC cardioversion, February 2005; March 2005, with subsequent sustained atrial fibrillation Tikosyn therapy followed by EP study and successful superior pulmonary vein isolation, as well as ablation of incessant right atrial tachycardia, May 2006 with subsequent apparent dual chamber pacemaker implant, July 2007 with subsequent EP study and AV node ablation with acceptable EP follow up, February 2009 with abnormal pacemaker interrogation with 29% AMS without reprogramming, August 2011, January 2012 with subsequent repeat XAVI and extensive attempt at ablation of atrial fibrillation with development of persistent atrial flutter, June 2012 with contemplated DC cardioversion, September 2012.   8. Watchman device implanted September 2015 by Dr. Zelaya.  a. Apparent recent pulse generator change; data deficit.  b. XAVI, November 2016; a 24 mm Watchman device visualized within left atrial appendage, device appears  secure, small amount of fluid around one edge of the device measuring approximately 4 mm. The velocities within the device appeared to be 10-20 mmHg.  No thrombus or fibrinous strands appear to be in association with the device.  LVEF 0.60  c. Pacemaker interrogation, June 2017; normal function, 100% mode switch, 95% RV paced; 9.5-10.5 years on battery, normal pacemaker check in  November 2018 with no nodes switches  d. Acceptable pacemaker interrogation, August 2020  9. Post procedure hematuria with hospitalization for hypokalemia, anemia, uncontrolled hematuria requiring cystoscopy and apparent cauterization of bladder tear -- data deficit ([facility]), May 2006 with progressive apparent lower tract obstructive symptoms and contemplated TURP - data deficit, June 2010.   10. Remote operations:  a. Upper extremity lipoma removal.  b. [place] teeth extraction  11. Morbid obesity, BMI 48.8  12. Apparent severe anemia with transfusion and subsequent eight courses of weekly IV iron therapy - data deficit, autumn 2012.   13. Contemplated DC cardioversion, September 2012, with device interrogation demonstrating persistent atrial fibrillation with VVI rate of 40 with 100% ventricular paced, September 2013 with subsequent redo pulmonary vein isolation procedure, June 2012 with subsequent internal cardioversion of atrial fibrillation to normal sinus rhythm, September 2012 with ERAF.  14. Remote apparent acceptable colonoscopy with abnormal EGD demonstrating Vallecillo’s esophagitis with apparent esophageal carcinoma with subsequent endoscopic EGD and scheduled PET scan with contemplated laparoscopic surgical resection as well as repair of possible adjunctive repair of large incisional ventral hernia with contemplated cardiothoracic surgical consultation at [facility] - data deficit, August-September 2014 with subsequent radiotherapy and chemotherapy with subsequent exploratory  laparoscopic surgery with EGD without finding of residual cancer - data deficit, [facility], January 2015 with subsequent marked GI bleeding and apparent gastric ulcer - data deficit, [facility], February 2015.   15. [facility] ED visit 1/6/18 for CHF symptoms with chest x-ray demonstrating cardiomegaly with pulmonary edema  16. Type 2 diabetes mellitus, hemoglobin A1c 6.9%, April 2019  17. Melanoma excision right face, summer 2019 - data deficit  18. Renal tumor with biopsy, February 2020 at [facility] with subsequent apparent percutaneous ablation- data deficit, Spring 2020  19. COVID-19 November 2020 with associated fatigue and loss of taste, no hospitalization  20. Kidney stones winter 2020  21. Kidney tumor resection, winter 2021.  22. Hospitalization for acute decompensation congestive heart failure with severe iron deficiency anemia and echocardiogram- [facility], June 2022-data deficit        No Known Allergies    Current Outpatient Medications:   •  albuterol sulfate  (90 Base) MCG/ACT inhaler, 2 puffs Every 4 (Four) Hours As Needed., Disp: , Rfl:   •  amLODIPine-benazepril (LOTREL) 10-20 MG per capsule, 1 capsule 2 (Two) Times a Day., Disp: , Rfl:   •  aspirin  MG tablet, Take 325 mg by mouth Daily., Disp: , Rfl:   •  Calcium Carbonate-Vitamin D3 600-400 MG-UNIT tablet, Take 1 tablet by mouth 2 (Two) Times a Day., Disp: , Rfl:   •  cetirizine (zyrTEC) 10 MG tablet, Take 10 mg by mouth Daily., Disp: , Rfl:   •  chlorthalidone (HYGROTON) 25 MG tablet, TAKE 1/2 TABLET BY MOUTH EVERY DAY, Disp: 45 tablet, Rfl: 3  •  eplerenone (INSPRA) 25 MG tablet, TAKE 1 TABLET BY MOUTH EVERY DAY, Disp: 90 tablet, Rfl: 3  •  esomeprazole (NexIUM) 40 MG capsule, Take 40 mg by mouth daily., Disp: , Rfl:   •  fenofibrate 160 MG tablet, Take 160 mg by mouth Daily., Disp: , Rfl:   •  finasteride (PROSCAR) 5 MG tablet, Take  5 mg by mouth Daily., Disp: , Rfl: 3  •  Magnesium 400 MG capsule, Take 400 mg by mouth Daily. (Patient taking differently: Take 400 mg by mouth.), Disp: 90 capsule, Rfl: 1  •  metFORMIN (GLUCOPHAGE) 500 MG tablet, Take 500 mg by mouth 2 (Two) Times a Day With Meals., Disp: , Rfl:   •  nebivolol (BYSTOLIC) 10 MG tablet, TAKE 1 TABLET BY MOUTH EVERY DAY, Disp: 90 tablet, Rfl: 3  •  nitroglycerin (NITROSTAT) 0.4 MG SL tablet, 1 under the tongue as needed for angina, may repeat q5mins for up three doses, Disp: 100 tablet, Rfl: 1  •  potassium chloride (K-TAB) 20 MEQ tablet controlled-release ER tablet, Take 2 tablets by mouth 3 (Three) Times a Day With Meals. (Patient taking differently: Take 40 mEq by mouth 4 (Four) Times a Day.), Disp: 270 tablet, Rfl: 4  •  prenatal vitamin tablet, Take 1 tablet by mouth 2 (two) times a day., Disp: , Rfl:   •  rOPINIRole (REQUIP) 5 MG tablet, Take 5 mg by mouth Every Night., Disp: , Rfl:   •  rosuvastatin (CRESTOR) 40 MG tablet, TAKE 1 TABLET BY MOUTH EVERY DAY, Disp: 90 tablet, Rfl: 3  •  sertraline (ZOLOFT) 50 MG tablet, Take 50 mg by mouth Daily., Disp: , Rfl:   •  tamsulosin (FLOMAX) 0.4 MG capsule 24 hr capsule, Take 1 capsule by mouth Every Night., Disp: , Rfl:   •  torsemide (DEMADEX) 20 MG tablet, Take 1 tablet by mouth Daily. Take two tablets every other day, alternating between 20 and 40 mg daily, Disp: 135 tablet, Rfl: 3    History of Present Illness: Patient returns for follow up after 9 month hiatus for follow up. Patient reports that he had a hospitalization for heart failure and anemia-data deficit. He sees Dr. Knott regularly. He has has had his initial iron infusions for his severe deficiency. He plans on mowing his yard tomorrow, and he has a riding mower. He typically mows in the evenings. He reports that he is able to sleep well at night, and that he had been sleeping in a recliner, but is now in a bed. He reports that he went fishing recently, and that he plans on  "going to [place] later this summer. Patient denies chest pain, shortness of breath, orthopnea, palpitations, edema, dizziness, and syncope.  He has had no additional interim ER visits, hospitalizations, serious illnesses, or surgeries.         ROS   Obtained and negative except as outlined in problem list and HPI.    Procedures       Objective:       Vitals:    06/24/22 1425 06/24/22 1426   BP: 140/72 118/61   BP Location: Right arm Right arm   Patient Position: Sitting Standing   Pulse: 91 73   SpO2: 91% 96%   Weight: 133 kg (294 lb) 120 kg (265 lb)   Height: 182.9 cm (72\") 182.9 cm (72\")     Body mass index is 35.94 kg/m².  Last weight: 309 lbs    Vitals reviewed.   Constitutional:       Appearance: Well-developed.   Neck:      Thyroid: No thyromegaly.      Vascular: No carotid bruit or JVD.      Lymphadenopathy: No cervical adenopathy.   Pulmonary:      Effort: Pulmonary effort is normal.      Breath sounds: Decreased breath sounds present. No wheezing. No rhonchi. No rales.   Cardiovascular:      Regular rhythm.      Murmurs: There is a grade 1/6 harsh midsystolic murmur at the URSB, radiating to the neck.      No gallop. No S3 gallop.   Pulses:     Dorsalis pedis: 1+ bilaterally.     Posterior tibial: 2+ bilaterally.  Edema:     Peripheral edema present.     Pretibial: bilateral 1+ edema of the pretibial area.     Ankle: bilateral 1+ edema of the ankle.  Abdominal:      Palpations: Abdomen is soft. There is no abdominal mass.      Tenderness: There is no abdominal tenderness.   Musculoskeletal: Normal range of motion. Skin:     General: Skin is warm and dry.      Findings: No rash.   Neurological:      Mental Status: Alert and oriented to person, place, and time.           Lab Review:     Lab date: 5/17/2022  • CMP: Glu 121, BUN 11.8, Creat 0.80, eGFR >59, Na 143, K 3.1, Cl 102.2, CO2 32, Ca 8.9, Alk Phos 53, AST 22, ALT 17  • CBC: WBC 7.5, RBC 4.27, HGB 11.3, HCT 36, MCV 84.3, MCH 26.5,   • HbA1c: " 7.0    Lab date: 5/14/2020  • FLP: , , HDL 38, LDL 52    Echocardiogram, 6/13/2022 ([facility])  EF 55-60%. Grade II diastolic dysfunction. LA dilation. RV enlargement. Mild MR, TR, and PI. PHTN. RVSP 61 mmHg.         Assessment:       Overall continued acceptable course with no new interim cardiopulmonary complaints with fair functional status. We will defer additional diagnostic or therapeutic intervention from a cardiac perspective at this time.     Diagnosis Plan   1. Ischemic heart disease  No recurrent angina pectoris or CHF on current activity schedule; continue current treatment   2. Persistent atrial fibrillation (HCC)  Stable and asymptomatic. Continue current treatment.   3. Essential hypertension  Well controlled.   4. Dyslipidemia  Suboptimal control. Continue current treatment, including heart healthy diet, exercise, and rosuvastatin.          Plan:         1. Patient to continue current medications and close follow up with the above providers.  2. Tentative cardiology follow up in November/December 2022 or patient may return sooner PRN.    Scribed for Cornelio Jimenez MD by Vickie Del Angel. 6/24/2022  14:47 EDT    I, Cornelio Jimenez MD, [facility], personally performed the services described in this documentation as scribed by the above named individual in my presence, and it is both accurate and complete. At 16:33 EDT on 06/24/2022

## 2022-06-24 ENCOUNTER — OFFICE VISIT (OUTPATIENT)
Dept: CARDIOLOGY | Facility: CLINIC | Age: 77
End: 2022-06-24

## 2022-06-24 VITALS
SYSTOLIC BLOOD PRESSURE: 118 MMHG | HEIGHT: 72 IN | OXYGEN SATURATION: 96 % | BODY MASS INDEX: 35.89 KG/M2 | HEART RATE: 73 BPM | DIASTOLIC BLOOD PRESSURE: 61 MMHG | WEIGHT: 265 LBS

## 2022-06-24 DIAGNOSIS — E78.5 DYSLIPIDEMIA: ICD-10-CM

## 2022-06-24 DIAGNOSIS — I10 ESSENTIAL HYPERTENSION: ICD-10-CM

## 2022-06-24 DIAGNOSIS — I25.9 ISCHEMIC HEART DISEASE: Primary | ICD-10-CM

## 2022-06-24 DIAGNOSIS — I48.19 PERSISTENT ATRIAL FIBRILLATION: ICD-10-CM

## 2022-06-24 PROCEDURE — 99214 OFFICE O/P EST MOD 30 MIN: CPT | Performed by: INTERNAL MEDICINE

## 2022-06-24 RX ORDER — ALBUTEROL SULFATE 90 UG/1
2 AEROSOL, METERED RESPIRATORY (INHALATION) EVERY 4 HOURS PRN
COMMUNITY
Start: 2022-03-31

## 2022-10-22 PROCEDURE — 93296 REM INTERROG EVL PM/IDS: CPT | Performed by: INTERNAL MEDICINE

## 2022-10-22 PROCEDURE — 93294 REM INTERROG EVL PM/LDLS PM: CPT | Performed by: INTERNAL MEDICINE

## 2022-11-01 RX ORDER — EPLERENONE 25 MG/1
TABLET, FILM COATED ORAL
Qty: 90 TABLET | Refills: 1 | Status: SHIPPED | OUTPATIENT
Start: 2022-11-01

## 2023-01-21 PROCEDURE — 93296 REM INTERROG EVL PM/IDS: CPT | Performed by: INTERNAL MEDICINE

## 2023-01-21 PROCEDURE — 93294 REM INTERROG EVL PM/LDLS PM: CPT | Performed by: INTERNAL MEDICINE

## 2023-02-10 RX ORDER — CHLORTHALIDONE 25 MG/1
TABLET ORAL
Qty: 45 TABLET | Refills: 0 | Status: SHIPPED | OUTPATIENT
Start: 2023-02-10

## 2023-03-16 ENCOUNTER — OFFICE VISIT (OUTPATIENT)
Dept: CARDIOLOGY | Facility: CLINIC | Age: 78
End: 2023-03-16
Payer: MEDICARE

## 2023-03-16 VITALS
HEART RATE: 83 BPM | HEIGHT: 74 IN | DIASTOLIC BLOOD PRESSURE: 89 MMHG | OXYGEN SATURATION: 98 % | SYSTOLIC BLOOD PRESSURE: 140 MMHG | BODY MASS INDEX: 34.02 KG/M2

## 2023-03-16 DIAGNOSIS — I10 PRIMARY HYPERTENSION: ICD-10-CM

## 2023-03-16 DIAGNOSIS — I10 ESSENTIAL HYPERTENSION: ICD-10-CM

## 2023-03-16 DIAGNOSIS — I48.21 PERMANENT ATRIAL FIBRILLATION: Primary | ICD-10-CM

## 2023-03-16 DIAGNOSIS — I25.10 CORONARY ARTERY DISEASE INVOLVING NATIVE CORONARY ARTERY OF NATIVE HEART WITHOUT ANGINA PECTORIS: ICD-10-CM

## 2023-03-16 PROCEDURE — 99213 OFFICE O/P EST LOW 20 MIN: CPT | Performed by: INTERNAL MEDICINE

## 2023-03-16 PROCEDURE — 93279 PRGRMG DEV EVAL PM/LDLS PM: CPT | Performed by: INTERNAL MEDICINE

## 2023-03-16 PROCEDURE — 3077F SYST BP >= 140 MM HG: CPT | Performed by: INTERNAL MEDICINE

## 2023-03-16 PROCEDURE — 3079F DIAST BP 80-89 MM HG: CPT | Performed by: INTERNAL MEDICINE

## 2023-03-16 NOTE — PROGRESS NOTES
Markus Allen  1945  [phone]    03/16/2023    [facility] CARDIOLOGY [place]     Referring Provider: No ref. provider found     [place],   [address] [address]    Chief Complaint   Patient presents with   • Atrial Fibrillation       Problem List:      1. Permanent atrial fibrillation  1. AV node ablation and pacemaker implantation  2. Oral anticoagulation  1. Watchman device implanted September 2015 by Dr. Zelaya.  2. XAVI, November 2016; a 24 mm Watchman device visualized within left atrial appendage, device appears secure, small amount of fluid around one edge of the device measuring approximately 4 mm. The velocities within the device appeared to be 10-20 mmHg.  No thrombus or fibrinous strands appear to be in association with the device.  LVEF 0.60  3. Ischemic heart disease:  a. Remote intermittent recurrent CCS class III-IV angina pectoris with abnormal positive intravenous adenosine SPECT/thallium-201 study/abnormal EKG with 3-vessel coronary atherosclerosis with severe 2-vessel branch involvement/acceptable left ventricular function, LVEF (0.70), December 1997.  b. Heart catheterization studies demonstrating diffuse 3-vessel coronary atherosclerosis with 90% stenosis at origin of the mid portion of second obtuse marginal branch and nondominant left circumflex coronary artery treated with angioplasty and stent deployment using drug eluting stent with acceptable left ventricular function, LVEF (0.62), January 2005.  c. Abnormal IV Adenowalk Quantitative SPECT gated Cardiolite study, LVEF (0.65), April 2010.  d. Residual CCS class I angina pectoris/NYHA class II to III exertional dyspnea and fatigue syndrome with recent [facility] ED evaluation for probable congestive heart failure, January 2018.  e. Echocardiogram 6/20/18: LA severely dilated, mild to moderate MR, mild-to-moderate TR, the following LV wall segments are hypokinetic: Apical septal, mid  inferoseptal, apex hypokinetic and mid anteroseptal.  LVEF 0.58, RV mildly dilated, no pericardial effusion, mild pulmonary hypertension, mild MAC present   4. Hypertension  5. Dyslipidemia.  6. Remote tobacco abuse, resolved.  7. Remote abnormal colonoscopy with findings of malignant polyp with subsequent sigmoid partial colectomy--data deficit, July 2014 with development of severe large incisional hernia.  8. Post procedure hematuria with hospitalization for hypokalemia, anemia, uncontrolled hematuria requiring cystoscopy and apparent cauterization of bladder tear -- data deficit ([facility]), May 2006 with progressive apparent lower tract obstructive symptoms and contemplated TURP - data deficit, June 2010.   9. Remote operations:  a. Upper extremity lipoma removal.  b. [place] teeth extraction  10. Morbid obesity, BMI 48.8  11. Apparent severe anemia with transfusion and subsequent eight courses of weekly IV iron therapy - data deficit, autumn 2012.   12. Remote apparent acceptable colonoscopy with abnormal EGD demonstrating Vallecillo’s esophagitis with apparent esophageal carcinoma with subsequent endoscopic EGD and scheduled PET scan with contemplated laparoscopic surgical resection as well as repair of possible adjunctive repair of large incisional ventral hernia with contemplated cardiothoracic surgical consultation at [facility] - data deficit, August-September 2014 with subsequent radiotherapy and chemotherapy with subsequent exploratory laparoscopic surgery with EGD without finding of residual cancer - data deficit, [facility], January 2015 with subsequent marked GI bleeding and apparent gastric ulcer - data deficit, [facility], February 2015.   13. Type 2 diabetes mellitus, hemoglobin A1c 6.9%, April 2019  14. Melanoma excision right face, summer 2019 - data deficit  15. Renal tumor with biopsy, February 2020 at  [facility] with subsequent apparent percutaneous ablation- data deficit, Spring 2020    Allergies  No Known Allergies    Current Medications    Current Outpatient Medications:   •  albuterol sulfate  (90 Base) MCG/ACT inhaler, 2 puffs Every 4 (Four) Hours As Needed., Disp: , Rfl:   •  amLODIPine-benazepril (LOTREL) 10-20 MG per capsule, 1 capsule 2 (Two) Times a Day., Disp: , Rfl:   •  aspirin  MG tablet, Take 325 mg by mouth Daily., Disp: , Rfl:   •  Calcium Carbonate-Vitamin D3 600-400 MG-UNIT tablet, Take 1 tablet by mouth 2 (Two) Times a Day., Disp: , Rfl:   •  cetirizine (zyrTEC) 10 MG tablet, Take 10 mg by mouth Daily., Disp: , Rfl:   •  chlorthalidone (HYGROTON) 25 MG tablet, TAKE 1/2 TABLET BY MOUTH EVERY DAY, Disp: 45 tablet, Rfl: 0  •  eplerenone (INSPRA) 25 MG tablet, TAKE 1 TABLET BY MOUTH DAILY, Disp: 90 tablet, Rfl: 1  •  esomeprazole (NexIUM) 40 MG capsule, Take 40 mg by mouth daily., Disp: , Rfl:   •  fenofibrate 160 MG tablet, Take 160 mg by mouth Daily., Disp: , Rfl:   •  finasteride (PROSCAR) 5 MG tablet, Take 5 mg by mouth Daily., Disp: , Rfl: 3  •  Magnesium 400 MG capsule, Take 400 mg by mouth Daily. (Patient taking differently: Take 400 mg by mouth.), Disp: 90 capsule, Rfl: 1  •  metFORMIN (GLUCOPHAGE) 500 MG tablet, Take 500 mg by mouth 2 (Two) Times a Day With Meals., Disp: , Rfl:   •  nebivolol (BYSTOLIC) 10 MG tablet, TAKE 1 TABLET BY MOUTH EVERY DAY, Disp: 90 tablet, Rfl: 3  •  nitroglycerin (NITROSTAT) 0.4 MG SL tablet, 1 under the tongue as needed for angina, may repeat q5mins for up three doses, Disp: 100 tablet, Rfl: 1  •  potassium chloride (K-TAB) 20 MEQ tablet controlled-release ER tablet, Take 2 tablets by mouth 3 (Three) Times a Day With Meals. (Patient taking differently: Take 40 mEq by mouth 4 (Four) Times a Day.), Disp: 270 tablet, Rfl: 4  •  prenatal vitamin tablet, Take 1 tablet by mouth 2 (two) times a day., Disp: , Rfl:   •  rOPINIRole (REQUIP)  "5 MG tablet, Take 5 mg by mouth Every Night., Disp: , Rfl:   •  rosuvastatin (CRESTOR) 40 MG tablet, TAKE 1 TABLET BY MOUTH EVERY DAY, Disp: 90 tablet, Rfl: 3  •  sertraline (ZOLOFT) 50 MG tablet, Take 50 mg by mouth Daily., Disp: , Rfl:   •  tamsulosin (FLOMAX) 0.4 MG capsule 24 hr capsule, Take 1 capsule by mouth Every Night., Disp: , Rfl:   •  torsemide (DEMADEX) 20 MG tablet, Take 1 tablet by mouth Daily. Take two tablets every other day, alternating between 20 and 40 mg daily, Disp: 135 tablet, Rfl: 3    History of Present Illness     Pt presents for follow up of AF/AVB/HTN. Since we last saw the pt, the patient was hospitalized at [facility] for 11 days due to COVID infection that was significant and fever, chills, and shortness of breath.  He is slowly getting better from that infection.  Pt denies any significant palpitations, SOB, CP, LH, and dizziness.  Blood pressure and heart rates have been stable at home.  Mostly he complains of easy fatigability and weakness.  He lost 51 pounds during this hospitalization    ROS:  General:  + fatigue, + weight loss  Cardiovascular:  Denies CP, PND, syncope, near syncope, edema or palpitations.  Pulmonary:  Denies URIBE, cough, or wheezing      Vitals:    03/16/23 1314   BP: 140/89   BP Location: Left arm   Patient Position: Sitting   Pulse: 83   SpO2: 98%   Height: 188 cm (74\")     Body mass index is 34.02 kg/m².  PE:  General: NAD repeat blood pressure 126/72 mmHg.  Neck: no JVD, no carotid bruits, no TM  Heart RRR, NL S1, S2,  no rubs, murmurs  Lungs: CTA, no wheezes, rhonchi, or rales  Abd: soft, non-tender, NL BS, large ventral hernia  Ext: No musculoskeletal deformities, no edema, cyanosis, or clubbing  Psych: normal mood and affect    Diagnostic Data:      Procedures           1. Permanent atrial fibrillation (HCC)    2. Essential hypertension    3. Primary hypertension    4. Coronary artery disease involving native coronary artery of native heart without " angina pectoris          Plan:     1) Permanent atrial fibrillation: s/p AVN and VVIR Pacemaker. Stable function on device check today. Heart rate control.   Pacemaker function normal.  2)  Anticoagulation s/p ROSHAN occlusion with Watchman device on ASA due to recurrent GI bleed in the past.  No CVA/ TIA symptoms.  Doing well.  3) HTN: Stable on current medications.  Patient is to call us with a list of medications that he forgot to bring him in today.  4) CAD: per Dr Jimenez. No anginal symptoms.       F/up in 6 months

## 2023-04-22 PROCEDURE — 93296 REM INTERROG EVL PM/IDS: CPT | Performed by: INTERNAL MEDICINE

## 2023-04-22 PROCEDURE — 93294 REM INTERROG EVL PM/LDLS PM: CPT | Performed by: INTERNAL MEDICINE

## 2023-05-02 ENCOUNTER — DOCUMENTATION (OUTPATIENT)
Dept: CARDIOLOGY | Facility: CLINIC | Age: 78
End: 2023-05-02
Payer: MEDICARE

## 2023-05-02 RX ORDER — EPLERENONE 25 MG/1
TABLET, FILM COATED ORAL
Qty: 30 TABLET | Refills: 11 | Status: SHIPPED | OUTPATIENT
Start: 2023-05-02

## 2023-05-02 RX ORDER — CHLORTHALIDONE 25 MG/1
TABLET ORAL
Qty: 45 TABLET | Refills: 11 | Status: SHIPPED | OUTPATIENT
Start: 2023-05-02

## 2023-05-02 NOTE — PROGRESS NOTES
Key: [id number] - Rx #: [id number]    Authorization already on file for this request. Authorization starting on 01/01/2023 and ending on 12/31/2023.

## 2023-05-08 RX ORDER — EPLERENONE 25 MG/1
TABLET, FILM COATED ORAL
Qty: 30 TABLET | Refills: 1 | OUTPATIENT
Start: 2023-05-08

## 2023-07-22 PROCEDURE — 93296 REM INTERROG EVL PM/IDS: CPT | Performed by: INTERNAL MEDICINE

## 2023-07-22 PROCEDURE — 93294 REM INTERROG EVL PM/LDLS PM: CPT | Performed by: INTERNAL MEDICINE

## 2024-03-19 ENCOUNTER — TELEPHONE (OUTPATIENT)
Dept: CARDIOLOGY | Facility: CLINIC | Age: 79
End: 2024-03-19
Payer: MEDICARE

## 2024-03-19 NOTE — TELEPHONE ENCOUNTER
Name: Jessica Allen    Relationship: Emergency Contact    Best Callback Number: [phone]    Incoming call to the Hub, requesting to  Reschedule their Device Check appointment on 3.21.24.     Per Hub workflow, further review is needed before the task can be completed.    Result of Call: Transferred to [facility] at the practice

## 2024-07-23 RX ORDER — NEBIVOLOL 10 MG/1
10 TABLET ORAL DAILY
Qty: 90 TABLET | Refills: 3 | Status: SHIPPED | OUTPATIENT
Start: 2024-07-23

## 2024-07-27 PROCEDURE — 93296 REM INTERROG EVL PM/IDS: CPT | Performed by: INTERNAL MEDICINE

## 2024-07-27 PROCEDURE — 93294 REM INTERROG EVL PM/LDLS PM: CPT | Performed by: INTERNAL MEDICINE

## 2024-08-14 ENCOUNTER — TELEPHONE (OUTPATIENT)
Dept: CARDIOLOGY | Facility: CLINIC | Age: 79
End: 2024-08-14
Payer: MEDICARE

## 2024-08-14 NOTE — TELEPHONE ENCOUNTER
Name: Jessica Allen    Relationship: Emergency Contact    Best Callback Number: [phone]    Incoming call to the Hub, requesting to  Cancel their Device Check appointment on 8.15.24.     Per Hub workflow, further review is needed before the task can be completed.    Result of Call: TRANSFERRED TO OFFICE.

## 2024-08-19 NOTE — TELEPHONE ENCOUNTER
LA LONDON TO SEE IF PT WAS READY TO RS SHE SAID HE IS NOT BUT SHE WILL CALL BACK WHEN THEY ARE READY TO RS    Pt's wife left bedside. Left number to be contacted when pt is transferred.     [facility] [phone]